# Patient Record
Sex: MALE | Race: WHITE | NOT HISPANIC OR LATINO | Employment: OTHER | URBAN - METROPOLITAN AREA
[De-identification: names, ages, dates, MRNs, and addresses within clinical notes are randomized per-mention and may not be internally consistent; named-entity substitution may affect disease eponyms.]

---

## 2018-02-13 ENCOUNTER — OFFICE VISIT (OUTPATIENT)
Dept: BARIATRICS | Facility: CLINIC | Age: 58
End: 2018-02-13

## 2018-02-13 VITALS
SYSTOLIC BLOOD PRESSURE: 150 MMHG | TEMPERATURE: 97.2 F | HEART RATE: 78 BPM | BODY MASS INDEX: 45.1 KG/M2 | HEIGHT: 70 IN | DIASTOLIC BLOOD PRESSURE: 80 MMHG | WEIGHT: 315 LBS

## 2018-02-13 DIAGNOSIS — E66.01 MORBID (SEVERE) OBESITY DUE TO EXCESS CALORIES (HCC): Primary | ICD-10-CM

## 2018-02-13 DIAGNOSIS — E66.09 OBESITY DUE TO EXCESS CALORIES, UNSPECIFIED CLASSIFICATION, UNSPECIFIED WHETHER SERIOUS COMORBIDITY PRESENT: Primary | ICD-10-CM

## 2018-02-13 PROCEDURE — RECHECK: Performed by: DIETITIAN, REGISTERED

## 2018-02-13 RX ORDER — LABETALOL 300 MG/1
300 TABLET, FILM COATED ORAL 2 TIMES DAILY
COMMUNITY
Start: 2017-04-27 | End: 2018-10-25 | Stop reason: ALTCHOICE

## 2018-02-13 RX ORDER — VALSARTAN 160 MG/1
1 TABLET ORAL
COMMUNITY
End: 2018-09-27 | Stop reason: ALTCHOICE

## 2018-02-13 RX ORDER — PANTOPRAZOLE SODIUM 40 MG/1
1 TABLET, DELAYED RELEASE ORAL DAILY
COMMUNITY
Start: 2017-04-27

## 2018-02-13 RX ORDER — FELODIPINE 10 MG/1
10 TABLET, EXTENDED RELEASE ORAL DAILY
COMMUNITY
End: 2018-10-17 | Stop reason: HOSPADM

## 2018-02-13 NOTE — PROGRESS NOTES
Bariatric Nutrition Assessment Note    Type of surgery    Interested in Vertical sleeve gastrectomy  Surgeon: Dr Ankit Herron  59 y o   male     Target weight: 172 7 lbs  Vitals:    02/13/18 0818   BP: 150/80   Pulse: 78   Temp: (!) 97 2 °F (36 2 °C)       Weight History   Onset of Obesity: Childhood  Family history of obesity: Yes  Wt Loss Attempts: Commercial Programs (IAC/[employer]th [employer], Tavares Ramachandran, etc )  FAD Diets (Cabbage soup, Grapefruit, Cleanse, etc )  Meal Replacements (Medifast, Slim Fast, etc )  Maximum Wt Lost: 45 lbs    Review of History and Medications   Past Medical History:   Diagnosis Date    Lumbar disc disease     Sleep apnea      Past Surgical History:   Procedure Laterality Date    BACK SURGERY      CHOLECYSTECTOMY       Social History     Social History    Marital status: /Civil Union     Spouse name: N/A    Number of children: N/A    Years of education: N/A     Social History Main Topics    Smoking status: Never Smoker    Smokeless tobacco: Never Used    Alcohol use Yes    Drug use: No    Sexual activity: Not Asked     Other Topics Concern    None     Social History Narrative    None       Current Outpatient Prescriptions:     felodipine (PLENDIL) 10 MG 24 hr tablet, Take by mouth, Disp: , Rfl:     labetalol (NORMODYNE) 300 mg tablet, Take 1 tablet by mouth, Disp: , Rfl:     pantoprazole (PROTONIX) 40 mg tablet, Take 1 tablet by mouth, Disp: , Rfl:     valsartan (DIOVAN) 160 mg tablet, Take 1 tablet by mouth, Disp: , Rfl:   Food Intake and Lifestyle Assessment   Food Intake Assessment completed via food log brought by patient  Breakfast: coffee, bagel  Snack: none  Lunch: kit reza candy bar, nuts  Snack: none  Dinner: [place] food  Snack: none  Beverage intake: water, diet soda, coffee/tea and alcohol  Estimated fluid intake per day: 40-60 oz water daily  Meals eaten away from home: 1-2 times a month  Typical meal pattern: 3 meals per day and 0 snacks per day  Eating Behaviors: Consumption of high calorie/ high fat foods, Large portion sizes and Mindless eating  Food allergies or intolerances: No Known Allergies  Cultural or Mosque considerations: Religion    Physical Assessment  Physical Activity  Types of exercise: None  Current physical limitations: back surgery 4 months ago    Psychosocial Assessment   Support systems: spouse  Socioeconomic factors: n/a    Nutrition Diagnosis  Diagnosis: Overweight / Obesity (NC-3 3) and Undesirable food choices (NB-1 7)  Related to: Physical inactivity and Excessive energy intake  As Evidenced by: BMI >25     Nutrition Prescription: Recommend the following diet  Low sugar and High protein    Interventions and Teaching   Discussed pre-op and post-op nutrition guidelines  Patient educated and handouts provided  Surgical changes to stomach / GI  Capacity of post-surgery stomach  Adequate hydration  Exercise  Suggestions for pre-op diet  Meal planning and preparation  Dietary and lifestyle changes  Techniques for self monitoring and keeping daily food journal  Vitamin / Mineral supplementation of Multivitamin with minerals and Vitamin D    Education provided to: patient and and wife who was also present at session    Barriers to learning: Consumption of high calorie/ high fat foods and Large portion sizes    Readiness to change: action    Prior research on procedure: internet and friends or family    Comprehension: verbalizes understanding     Expected Compliance: good  Recommendations  Pt is an appropriate candidate for surgery   Yes    Evaluation / Monitoring  Dietitian to Monitor: Eating pattern as discussed Body weight    Goals  Food journal, Complete lession plans 1-6 and Eat 3 meals per day, discuss physical activity with physician due to recent back surgery    Time Spent:   1 Hour

## 2018-02-13 NOTE — PROGRESS NOTES
Bariatric Behavioral Health Evaluation    Presenting Problem:  Jaycob Pain, : 1960    Patient here to reduce chronic pain, increase health and increase mobility    Is the patient seeking Bariatric Surgery Eval? Yes  If yes how long have you researched this surgery option  Patient has been doing research for the past 2 months  He does have a sister with bariatric surgery done 2 years ago  Realizes Post- Op Requirements? Yes     Pre-morbid level of function and history of present illness: Patient had back surgery (2017)    Psychiatric/Psychological Treatment Diagnosis:  Patient denies Axis I diagnosis or treatment    Outpatient Counselor No     Psychiatrist No     Have you had Inpatient Treatment?  No    Family Constellation (include relationship with each and Psych/Med HX)    Siblings  obesity    Domestic Violence No    Patient denies childhood trauma    Additional comments/stressors related to family/relationships/peer support:  Health, weight and chronic pain    Physical/Psychological Assessment:     Appearance: appropriate  Sociability: average  Affect: appropriate  Mood: calm  Thought Process: coherent  Speech: normal  Content: no impairment  Orientation: person  Yes , place  Yes , time  Yes , normal attention span  Yes , normal memory  Yes  , decreased in concentration ability  Yes  and normal judgement  Yes   Insight: emotional  good    Risk Assessment:     none    Recommendations: Recommended for surgery  yes    Risk of Harm to Self or Others:     Observation:     Interviews this interview only    Access to weapons yes     Weapons secured by: kept in protected area    Based on the previous information, the client presents the following risk of harm to self or others: low    BARIATRIC SURGERY EDUCATION CHECKLIST    I have received education related to my bariatric surgery process and understand:    Patients may be required to complete a psychiatric evaluation and receive clearance for surgery from their psychiatrist     Patients who undergo weight loss surgery are at higher risk of increased mental health concerns and suicide attempts  Patients may be required to complete a full substance abuse evaluation and then complete all treatment recommendations prior to surgery  If diagnosis of abuse/dependence results, patient may be required to remain sober for one (1) year before having bariatric surgery  Patients on psychiatric medications should check with their provider to discuss psychiatric medications and the changes in absorption  Patient should discuss all time release medications with provider and take all medications as prescribed  The recommendation is that there is no use of  any tobacco products, Hookah or  vapes for the bariatric post-operation patient  Bariatric surgery patients should not consume alcohol as a post-operative patient as it may increase risk of numerous health conditions including but not limited to alcohol abuse and ulcers  There is a possibility of weight regain if patient does not follow all program guidelines and recommendations  Bariatric surgery patients should exercise thirty (30) to sixty (60) minutes per day to maintain post-surgical weight loss  Research indicates that bariatric patients are more successful when they see a therapist for up to two (2) years post-op  Patients will follow all medical and dietary recommendations provided  Patient will keep all scheduled appointments and follow up with their physician for a minimum of five (5) years  Patient will take all vitamins as recommended  Post-operative vitamins are life-long  Patient reviewed Bariatric Surgery Education Checklist and agrees they have received education on these issues   Note:  Patient denies Axis I diagnosis or treatment  Patient educated regarding the impact of nicotine and alcohol on the post bariatric surgery patient    Patient meets criteria for surgery at this program and is referred to physician

## 2018-03-14 ENCOUNTER — OFFICE VISIT (OUTPATIENT)
Dept: BARIATRICS | Facility: CLINIC | Age: 58
End: 2018-03-14
Payer: COMMERCIAL

## 2018-03-14 VITALS
DIASTOLIC BLOOD PRESSURE: 70 MMHG | TEMPERATURE: 97.2 F | WEIGHT: 315 LBS | BODY MASS INDEX: 45.1 KG/M2 | HEIGHT: 70 IN | SYSTOLIC BLOOD PRESSURE: 130 MMHG | RESPIRATION RATE: 18 BRPM | HEART RATE: 89 BPM

## 2018-03-14 DIAGNOSIS — R63.5 ABNORMAL WEIGHT GAIN: ICD-10-CM

## 2018-03-14 DIAGNOSIS — K21.9 GASTROESOPHAGEAL REFLUX DISEASE, ESOPHAGITIS PRESENCE NOT SPECIFIED: ICD-10-CM

## 2018-03-14 DIAGNOSIS — Z01.818 PREOPERATIVE CLEARANCE: ICD-10-CM

## 2018-03-14 DIAGNOSIS — I10 BENIGN ESSENTIAL HTN: ICD-10-CM

## 2018-03-14 DIAGNOSIS — E66.01 MORBID OBESITY WITH BODY MASS INDEX (BMI) OF 40.0 TO 49.9 (HCC): Primary | ICD-10-CM

## 2018-03-14 DIAGNOSIS — R73.01 IFG (IMPAIRED FASTING GLUCOSE): ICD-10-CM

## 2018-03-14 DIAGNOSIS — G47.33 OSA (OBSTRUCTIVE SLEEP APNEA): ICD-10-CM

## 2018-03-14 PROCEDURE — 99204 OFFICE O/P NEW MOD 45 MIN: CPT | Performed by: PHYSICIAN ASSISTANT

## 2018-03-14 NOTE — PATIENT INSTRUCTIONS
Goals: Food log (ie ) www myfitnesspal com,sparkpeople  com,loseit com,calorieking  com,etc  baritastic  No sugary beverages  At least 64oz of water daily    Try to increase exercise by 10 minutes/day  Practice lesson plans 1-6 in bariatric manual   Practice 30/60 rule  Increase physical activity by 10 minutes daily  ~1800 calories/day    B: protein shake + fruit  L: 3-4 oz lean protein + 1 cup nonstarchy vegetables  D: 5 oz lean protein + 1 c nonstarchy vegetables  + <1/2 c starch  S: lean protein: string cheese, greek/soy yogurt, 1/4 c nuts + fruits/vegetables

## 2018-03-14 NOTE — ASSESSMENT & PLAN NOTE
Interested in Vertical Sleeve Gastrectomy with Dr Bipin Salinas  10% Weight loss-Not applicable   Screening labs-CBC, CMP, A1c, and LP within acceptable limits from 1/12/18   Check TSH, may tiburcio to repeat CBC and CMP prior to surgery  Support Group-Not Completed  Cardiac Risk Assessment-Not Completed  Upper Endoscopy-Not Completed  Sleep Medicine Assessment-using CPAP  Alcohol and nicotine use is not recommended following bariatric surgery  NSAIDs should be avoided following bariatric surgery

## 2018-04-11 ENCOUNTER — TELEPHONE (OUTPATIENT)
Dept: BARIATRICS | Facility: CLINIC | Age: 58
End: 2018-04-11

## 2018-04-25 ENCOUNTER — OFFICE VISIT (OUTPATIENT)
Dept: BARIATRICS | Facility: CLINIC | Age: 58
End: 2018-04-25
Payer: COMMERCIAL

## 2018-04-25 VITALS
BODY MASS INDEX: 45.1 KG/M2 | DIASTOLIC BLOOD PRESSURE: 82 MMHG | RESPIRATION RATE: 20 BRPM | HEIGHT: 70 IN | HEART RATE: 88 BPM | WEIGHT: 315 LBS | SYSTOLIC BLOOD PRESSURE: 134 MMHG | TEMPERATURE: 98 F

## 2018-04-25 DIAGNOSIS — E66.01 OBESITY, CLASS III, BMI 40-49.9 (MORBID OBESITY) (HCC): Primary | ICD-10-CM

## 2018-04-25 PROBLEM — E66.9 DIABETES MELLITUS TYPE 2 IN OBESE (HCC): Status: ACTIVE | Noted: 2018-04-25

## 2018-04-25 PROBLEM — E11.69 DIABETES MELLITUS TYPE 2 IN OBESE (HCC): Status: ACTIVE | Noted: 2018-04-25

## 2018-04-25 PROBLEM — K21.9 GASTROESOPHAGEAL REFLUX DISEASE: Status: ACTIVE | Noted: 2018-04-25

## 2018-04-25 PROBLEM — Z98.1 HISTORY OF LUMBAR FUSION: Status: ACTIVE | Noted: 2018-04-25

## 2018-04-25 PROBLEM — Z90.49 HX LAPAROSCOPIC CHOLECYSTECTOMY: Status: ACTIVE | Noted: 2018-04-25

## 2018-04-25 PROCEDURE — 99204 OFFICE O/P NEW MOD 45 MIN: CPT | Performed by: SURGERY

## 2018-04-25 RX ORDER — SILDENAFIL CITRATE 50 MG
TABLET ORAL
COMMUNITY
Start: 2018-04-11 | End: 2018-07-20

## 2018-04-25 RX ORDER — FLUTICASONE PROPIONATE 50 MCG
SPRAY, SUSPENSION (ML) NASAL
COMMUNITY
Start: 2018-04-11 | End: 2018-09-27 | Stop reason: ALTCHOICE

## 2018-04-25 NOTE — LETTER
April 25, 2018     Alex Mata MD  [address] [place] [address]    Patient: Lily Monson   YOB: 1960   Date of Visit: 4/25/2018       Dear Dr Sergio Esquivel:    Thank you for referring Ruben Mirza to me for evaluation  Below are the relevant portions of my assessment and plan of care      59 y o  yo male with a long standing h/o of obesity and inability to sustain any meaningful weight loss on his own despite several attempts      He is interested in the Laparoscopic sleeve gastrectomy but after discussions with me in office he is now also considering LRYGB which may be more beneficial for his weight loss and comorbidities  He will discuss this with his wife and return with his decision to the preop H&P      As a part of his pre op evaluation, he will be referred to a cardiologist   No need for sleep study as he has been diagnosed previously with SALAS on CPAP     He needs an EGD to evaluate the anatomy of his GI tract      I have spent over 45 minutes with him face to face in the office today discussing his options and details of the surgery  We have seen an animation of the surgery on the computer that illustrates how the operation is done and how the anatomy will be altered with the procedure   Over 50% of this was coordinating care      I have discussed and educated the patient with regards to the components of our multidisciplinary program and the importance of compliance and follow up in the post operative period      He was given the opportunity to ask questions and I have answered all of them      The patient was also instructed with regards to the importance of behavior modification, nutritional counseling, support meeting attendance and lifestyle changes that are important to ensure success      Although there is a great statistical chance of improvement or even resolution of most of his associated comorbidities, the results vary from patient to patient and they largely depend on his commitment and compliance  If you have questions, please do not hesitate to call me  I look forward to following Sandra Roque along with you           Sincerely,        Celsa Brown MD        CC: No Recipients

## 2018-04-25 NOTE — PROGRESS NOTES
BARIATRIC INITIAL CONSULT - BARIATRIC SURGERY    Atilio Martinez 59 y o  male MRN: [id number]  Unit/Bed#:  Encounter: [id number]      HPI:  Atilio Martinez is a 59 y o  male who presents with a longstanding history of morbid obesity and inability to sustain a meaningful weight loss  Here today to discuss bariatric options  Visit type: consultation     Symptoms: excess weight, weight increase, inability to loss weight, fatigue, knee pain and back pain    Associated Symptoms: sexual dysfunction    Associated Conditions: glucose intolerance, sleep apnea, abdominal obesity and hypergycemia  Disease Complications: diabetes, hypertension, sleep apnea and osteoarthritis  Weight Loss Interest: high  Previous Diet Trials: low calorie     Exercise Frequency:sedentary  Types of Exercise: walking    Review of Systems   All other systems reviewed and are negative  Historical Information   Past Medical History:   Diagnosis Date    Benign hypertension     Calculus of gallbladder without cholecystitis without obstruction     H/O nonmelanoma skin cancer     Lumbar disc disease     Mitral valve disorder     Obesity     Pre-operative laboratory examination     Sleep apnea     Sleep apnea      Past Surgical History:   Procedure Laterality Date    BACK SURGERY      CHOLECYSTECTOMY       Social History   History   Alcohol Use    Yes     Comment: occasionally     History   Drug Use No     History   Smoking Status    Never Smoker   Smokeless Tobacco    Never Used     Family History:   Family History   Problem Relation Age of Onset    Heart attack Father     Hypertension Father        Meds/Allergies   PTA meds:   Cannot display prior to admission medications because the patient has not been admitted in this contact       No Known Allergies    Objective     Current Vitals:   /82   Pulse 88   Temp 98 °F (36 7 °C)   Resp 20   Ht 5' 9 5" (1 765 m)   Wt (!) 152 kg (334 lb 8 oz)   BMI 48 69 kg/m² Invasive Devices          No matching active lines, drains, or airways          Physical Exam   Constitutional: He is oriented to person, place, and time  He appears well-developed  HENT:   Head: Normocephalic  Eyes: EOM are normal    Neck: Normal range of motion  Cardiovascular: Normal rate  Pulmonary/Chest: Effort normal    Abdominal: Soft  He exhibits no distension  There is no tenderness  There is no rebound and no guarding  Musculoskeletal: Normal range of motion  Neurological: He is alert and oriented to person, place, and time  Skin: Skin is warm and dry  Psychiatric: He has a normal mood and affect  His behavior is normal  Judgment and thought content normal        Lab Results: I have personally reviewed pertinent lab results  Assessment/PLAN:    59 y o  yo male with a long standing h/o of obesity and inability to sustain any meaningful weight loss on his own despite several attempts  He is interested in the Laparoscopic sleeve gastrectomy but after discussions with me in office he is now also considering LRYGB which may be more beneficial for his weight loss and comorbidities  He will discuss this with his wife and return with his decision to the preop H&P  As a part of his pre op evaluation, he will be referred to a cardiologist   No need for sleep study as he has been diagnosed previously with SALAS on CPAP    He needs an EGD to evaluate the anatomy of his GI tract  I have spent over 45 minutes with him face to face in the office today discussing his options and details of the surgery  We have seen an animation of the surgery on the computer that illustrates how the operation is done and how the anatomy will be altered with the procedure  Over 50% of this was coordinating care  I have discussed and educated the patient with regards to the components of our multidisciplinary program and the importance of compliance and follow up in the post operative period      He was given the opportunity to ask questions and I have answered all of them  The patient was also instructed with regards to the importance of behavior modification, nutritional counseling, support meeting attendance and lifestyle changes that are important to ensure success  Although there is a great statistical chance of improvement or even resolution of most of his associated comorbidities, the results vary from patient to patient and they largely depend on his commitment and compliance  He needs to lose 25 lbs prior to the operation        Hugo Li MD  4/25/2018  9:43 AM

## 2018-05-02 LAB — HBA1C MFR BLD HPLC: 5.5 %

## 2018-05-16 NOTE — ASSESSMENT & PLAN NOTE
Interested in Vertical Sleeve Gastrectomy with Dr Omari Bernal    10% Weight loss-Not applicable   Screening labs-labs within acceptable limits for bariatric surgery  Support Group-Not Completed  Cardiac Risk Assessment-Not Completed  Upper Endoscopy-scheduled for 5/18/18  Sleep Medicine Assessment-using CPAP  Alcohol and nicotine use is not recommended following bariatric surgery  NSAIDs should be avoided following bariatric surgery

## 2018-05-16 NOTE — PROGRESS NOTES
Assessment/Plan: Morbid obesity with body mass index (BMI) of 40 0 to 49 9 ([facility] )  Interested in Vertical Sleeve Gastrectomy with Dr Glenn Genao  10% Weight loss-Not applicable   Screening labs-labs within acceptable limits for bariatric surgery  Support Group-Not Completed  Cardiac Risk Assessment-Not Completed  Upper Endoscopy-scheduled for 5/18/18  Sleep Medicine Assessment-using CPAP  Alcohol and nicotine use is not recommended following bariatric surgery  NSAIDs should be avoided following bariatric surgery    Benign essential HTN  -improved on repeat  -taking valsartan, felodipine, and labetolol    SALAS (obstructive sleep apnea)  -encouraged continued use of CPAP machine    Diabetes mellitus type 2 in obese (HCC)  -A1c 6 5 % on recent labs  -should improve with weight loss, lifestyle changes, and following bariatric surgery  -f/u with PCP for further evaluation and management    GERD (gastroesophageal reflux disease)  -continue pantoprazole  -may benefit from RYGB > sleeve    Follow up in approximately 1 month with Surgical Dietician  Goals:  Food log (ie ) www myfitnesspal com,sparkpeople  com,loseit com,calorieking  com,etc  baritastic payal   No sugary beverages  At least 64oz of water daily    Increase physical activity by 10 minutes daily Gradually increase physical activity to a goal of 5 days per week for 30 minutes of MODERATE intensity PLUS 2 days per week of FULL BODY resistance training  Practice lesson plans 1-6 in bariatric manual   Practice 30/60 rule  1800 calories per day    B: premier protein + fruit or 2 eggs + fruit  L: 4 oz lean protein + 1 cup of nonstarchy vegetables or premier protein + 1 cup of nonstarchy vegetables  D: 5 oz lean protein + 1 cup of nonstarchy vegetables + <1/2 c of starch  S (1-2): egg, greek/soy protein, string cheese, 1/4 c of nuts, 1 tbsp PB + fruits/vegetables     Diagnoses and all orders for this visit:    Morbid obesity with body mass index (BMI) of 40 0 to 49 9 ([facility] )    Benign essential HTN    SALAS (obstructive sleep apnea)    Gastroesophageal reflux disease, esophagitis presence not specified    Diabetes mellitus type 2 in obese [facility])    Subjective:   Chief Complaint   Patient presents with    Weight Check     Patient here for 3/6 Bill-Weight Check  Patient ID: Ayaka Reed  is a 59 y o  male with excess weight/obesity here to pursue weight managment  Patient is pursuing Vertical Sleeve Gastrectomy  HPI  The patient presents for 3/6 weight check  The patient has been:  Following the lessons in the manual- yes  Practicing the 30/60 minute rule- yes  Food logging- no  Exercise- yes    B: skips or 2 eggs + 1 piece toast  L: leftovers  D: protein + vegetable + starch  S: ice cream    The following portions of the patient's history were reviewed and updated as appropriate: allergies, current medications, past family history, past medical history, past social history, past surgical history and problem list     Review of Systems   Respiratory: Negative for cough and shortness of breath  Cardiovascular: Negative for chest pain and palpitations  Gastrointestinal: Negative for abdominal pain, constipation, diarrhea, nausea and vomiting  Psychiatric/Behavioral:        Denies sx of depression     Objective:    /80 (BP Location: Left arm, Patient Position: Sitting, Cuff Size: Large)   Pulse 88   Temp 98 °F (36 7 °C) (Tympanic)   Resp 17   Ht 5' 9 5" (1 765 m)   Wt (!) 153 kg (336 lb 12 8 oz)   BMI 49 02 kg/m²      Physical Exam   Nursing note and vitals reviewed  Constitutional   General appearance: Abnormal   well developed and morbidly obese  Eyes No conjunctival pallor  Ears, Nose, Mouth, and Throat Oral mucosa moist    Pulmonary   Respiratory effort: No increased work of breathing or signs of respiratory distress  Auscultation of lungs: Clear to auscultation, equal breath sounds bilaterally, no wheezes, no rales, no rhonci  Cardiovascular   Auscultation of heart: Normal rate and rhythm, normal S1 and S2, without murmurs  Examination of extremities for edema and/or varicosities: trace edema  Abdomen   Abdomen: Abnormal   The abdomen was obese  Bowel sounds were normal  The abdomen was soft and nontender     Musculoskeletal   Gait and station: Normal     Psychiatric   Orientation to person, place and time: Normal     Affect: appropriate

## 2018-05-17 ENCOUNTER — ANESTHESIA EVENT (OUTPATIENT)
Dept: PERIOP | Facility: HOSPITAL | Age: 58
End: 2018-05-17
Payer: COMMERCIAL

## 2018-05-17 ENCOUNTER — OFFICE VISIT (OUTPATIENT)
Dept: BARIATRICS | Facility: CLINIC | Age: 58
End: 2018-05-17
Payer: COMMERCIAL

## 2018-05-17 VITALS
WEIGHT: 315 LBS | DIASTOLIC BLOOD PRESSURE: 80 MMHG | HEIGHT: 70 IN | HEART RATE: 88 BPM | TEMPERATURE: 98 F | BODY MASS INDEX: 45.1 KG/M2 | SYSTOLIC BLOOD PRESSURE: 134 MMHG | RESPIRATION RATE: 17 BRPM

## 2018-05-17 DIAGNOSIS — K21.9 GASTROESOPHAGEAL REFLUX DISEASE, ESOPHAGITIS PRESENCE NOT SPECIFIED: ICD-10-CM

## 2018-05-17 DIAGNOSIS — E66.01 MORBID OBESITY WITH BODY MASS INDEX (BMI) OF 40.0 TO 49.9 (HCC): Primary | ICD-10-CM

## 2018-05-17 DIAGNOSIS — G47.33 OSA (OBSTRUCTIVE SLEEP APNEA): ICD-10-CM

## 2018-05-17 DIAGNOSIS — I10 BENIGN ESSENTIAL HTN: ICD-10-CM

## 2018-05-17 DIAGNOSIS — E66.9 DIABETES MELLITUS TYPE 2 IN OBESE (HCC): ICD-10-CM

## 2018-05-17 DIAGNOSIS — E11.69 DIABETES MELLITUS TYPE 2 IN OBESE (HCC): ICD-10-CM

## 2018-05-17 PROCEDURE — 99214 OFFICE O/P EST MOD 30 MIN: CPT | Performed by: PHYSICIAN ASSISTANT

## 2018-05-17 NOTE — PATIENT INSTRUCTIONS
Goals: Food log (ie ) www myfitnesspal com,sparkpeople  com,loseit com,calorieking  com,etc  baritastic payal   No sugary beverages  At least 64oz of water daily    Increase physical activity by 10 minutes daily Gradually increase physical activity to a goal of 5 days per week for 30 minutes of MODERATE intensity PLUS 2 days per week of FULL BODY resistance training  Practice lesson plans 1-6 in bariatric manual   Practice 30/60 rule  1800 calories per day  Please attend support group    B: premier protein + fruit or 2 eggs + fruit  L: 4 oz lean protein + 1 cup of nonstarchy vegetables or premier protein + 1 cup of nonstarchy vegetables  D: 5 oz lean protein + 1 cup of nonstarchy vegetables + <1/2 c of starch  S (1-2): egg, greek/soy protein, string cheese, 1/4 c of nuts, 1 tbsp PB + fruits/vegetables

## 2018-05-17 NOTE — ASSESSMENT & PLAN NOTE
-A1c 6 5 % on recent labs  -should improve with weight loss, lifestyle changes, and following bariatric surgery  -f/u with PCP for further evaluation and management

## 2018-05-18 ENCOUNTER — ANESTHESIA (OUTPATIENT)
Dept: PERIOP | Facility: HOSPITAL | Age: 58
End: 2018-05-18
Payer: COMMERCIAL

## 2018-05-18 ENCOUNTER — HOSPITAL ENCOUNTER (OUTPATIENT)
Facility: HOSPITAL | Age: 58
Setting detail: OUTPATIENT SURGERY
Discharge: HOME | End: 2018-05-18
Attending: SURGERY | Admitting: SURGERY
Payer: COMMERCIAL

## 2018-05-18 VITALS
HEART RATE: 76 BPM | DIASTOLIC BLOOD PRESSURE: 63 MMHG | SYSTOLIC BLOOD PRESSURE: 124 MMHG | RESPIRATION RATE: 15 BRPM | BODY MASS INDEX: 44.1 KG/M2 | TEMPERATURE: 98.2 F | WEIGHT: 315 LBS | OXYGEN SATURATION: 95 % | HEIGHT: 71 IN

## 2018-05-18 DIAGNOSIS — E66.01 MORBID OBESITY (HCC): ICD-10-CM

## 2018-05-18 DIAGNOSIS — K21.9 GASTROESOPHAGEAL REFLUX DISEASE, ESOPHAGITIS PRESENCE NOT SPECIFIED: ICD-10-CM

## 2018-05-18 PROCEDURE — 43239 EGD BIOPSY SINGLE/MULTIPLE: CPT | Performed by: SURGERY

## 2018-05-18 PROCEDURE — 88305 TISSUE EXAM BY PATHOLOGIST: CPT | Performed by: PATHOLOGY

## 2018-05-18 PROCEDURE — 88342 IMHCHEM/IMCYTCHM 1ST ANTB: CPT | Performed by: PATHOLOGY

## 2018-05-18 RX ORDER — SODIUM CHLORIDE, SODIUM LACTATE, POTASSIUM CHLORIDE, CALCIUM CHLORIDE 600; 310; 30; 20 MG/100ML; MG/100ML; MG/100ML; MG/100ML
100 INJECTION, SOLUTION INTRAVENOUS CONTINUOUS
Status: DISCONTINUED | OUTPATIENT
Start: 2018-05-18 | End: 2018-05-18 | Stop reason: HOSPADM

## 2018-05-18 RX ORDER — PROPOFOL 10 MG/ML
INJECTION, EMULSION INTRAVENOUS AS NEEDED
Status: DISCONTINUED | OUTPATIENT
Start: 2018-05-18 | End: 2018-05-18 | Stop reason: SURG

## 2018-05-18 RX ADMIN — SODIUM CHLORIDE, SODIUM LACTATE, POTASSIUM CHLORIDE, AND CALCIUM CHLORIDE 100 ML/HR: .6; .31; .03; .02 INJECTION, SOLUTION INTRAVENOUS at 06:45

## 2018-05-18 RX ADMIN — PROPOFOL 100 MG: 10 INJECTION, EMULSION INTRAVENOUS at 07:36

## 2018-05-18 RX ADMIN — PROPOFOL 50 MG: 10 INJECTION, EMULSION INTRAVENOUS at 07:39

## 2018-05-18 NOTE — ANESTHESIA POSTPROCEDURE EVALUATION
Post-Op Assessment Note      CV Status:  Stable    Mental Status:  Awake    Hydration Status:  Stable    PONV Controlled:  None    Airway Patency:  Patent    Post Op Vitals Reviewed: Yes          Staff: Anesthesiologist, CRNA           /86 (05/18/18 0744)    Temp      Pulse 88 (05/18/18 0744)   Resp 14 (05/18/18 0744)    SpO2 98 % (05/18/18 0744)

## 2018-05-18 NOTE — H&P
This is a 59 y o  male with a history of morbid obesity and Body mass index is 46 78 kg/m²  Here for an EGD to evaluate the anatomy of the GI tract  Physical Exam    AAOx3  RRR  CTA B  Abdomen obese  Benign  A/P:    This is a 59 y o  male with a history of morbid obesity and Body mass index is 46 78 kg/m²       Will proceed with the EGD and biopsies        Romero Hazel MD  05/18/18  7:24 AM

## 2018-05-18 NOTE — ANESTHESIA PREPROCEDURE EVALUATION
Review of Systems/Medical History  Patient summary reviewed  Chart reviewed  No history of anesthetic complications     Cardiovascular  Hypertension controlled,    Pulmonary  Sleep apnea CPAP,        GI/Hepatic    GERD well controlled,             Endo/Other  Diabetes well controlled type 2 Diet controlled,   Obesity  super morbid obesity   GYN       Hematology  Negative hematology ROS      Musculoskeletal  Negative musculoskeletal ROS        Neurology  Negative neurology ROS      Psychology   Negative psychology ROS              Physical Exam    Airway    Mallampati score: III  TM Distance: >3 FB  Neck ROM: full     Dental   Comment: No loose,     Cardiovascular  Rhythm: regular, Rate: normal,     Pulmonary  Breath sounds clear to auscultation,     Other Findings        Anesthesia Plan  ASA Score- 3     Anesthesia Type- IV sedation with anesthesia with ASA Monitors  Additional Monitors:   Airway Plan:         Plan Factors-    Induction- intravenous  Postoperative Plan-     Informed Consent- Anesthetic plan and risks discussed with patient  I personally reviewed this patient with the CRNA  Discussed and agreed on the Anesthesia Plan with the CRNA  Lilliana Astudillo

## 2018-05-18 NOTE — DISCHARGE INSTRUCTIONS
Upper Endoscopy   WHAT YOU NEED TO KNOW:   An upper endoscopy is also called an upper gastrointestinal (GI) endoscopy, or an esophagogastroduodenoscopy (EGD)  You may feel bloated, gassy, or have some abdominal discomfort after your procedure  Your throat may be sore for 24 to 36 hours  You may burp or pass gas from air that is still inside your body  DISCHARGE INSTRUCTIONS:   Call 911 for any of the following:   · You have sudden chest pain or trouble breathing  Seek care immediately if:   · You feel dizzy or faint  · You have trouble swallowing  · Your bowel movements are very dark or black  · Your abdomen is hard and firm and you have severe pain  · You vomit blood  Contact your healthcare provider if:   · You feel full or bloated and cannot burp or pass gas  · You have not had a bowel movement for 3 days after your procedure  · You have neck pain  · You have a fever or chills  · You have nausea or are vomiting  · You have a rash or hives  · You have questions or concerns about your endoscopy  Relieve a sore throat:  Suck on throat lozenges or crushed ice  Gargle with a small amount of warm salt water  Mix 1 teaspoon of salt and 1 cup of warm water to make salt water  Relieve gas and discomfort from bloating:  Lie on your right side with a heating pad on your abdomen  Take short walks to help pass gas  Eat small meals until bloating is relieved  Rest after your procedure: You have been given medicine to relax you  Do not  drive or make important decisions until the day after your procedure  Return to your normal activity as directed  You can usually return to work the day after your procedure  Follow up with your healthcare provider as directed:  Write down your questions so you remember to ask them during your visits     © 2017 [address] is for End User's use only and may not be sold, redistributed or otherwise used for commercial purposes  All illustrations and images included in CareNotes® are the copyrighted property of A D A M , Inc  or Randolph Kam  The above information is an  only  It is not intended as medical advice for individual conditions or treatments  Talk to your doctor, nurse or pharmacist before following any medical regimen to see if it is safe and effective for you

## 2018-05-18 NOTE — OP NOTE
OPERATIVE REPORT  PATIENT NAME: Monica Wade    :  1960  MRN: [id number]  Pt Location: [facility] 02    SURGERY DATE: 2018    Surgeon(s) and Role:     * Sunny Moreira MD - Primary    Preop Diagnosis:  Morbid obesity ([facility] ) [E66 01]  Gastroesophageal reflux disease, esophagitis presence not specified [K21 9]    Post-Op Diagnosis Codes:     * Morbid obesity ([facility] ) [E66 01]     * Gastroesophageal reflux disease, esophagitis presence not specified [K21 9]    Procedure(s) (LRB):  ESOPHAGOGASTRODUODENOSCOPY (EGD) (N/A)    Specimen(s):  ID Type Source Tests Collected by Time Destination   1 : antrum Tissue Stomach TISSUE EXAM Sunny Moreira MD 2018 [id number]        Estimated Blood Loss:   Minimal    Drains: none       Anesthesia Type:   IV Sedation with Anesthesia    Operative Indications: Morbid obesity ([facility] ) [E66 01]  Gastroesophageal reflux disease, esophagitis presence not specified [K21 9]      Operative Findings:  Gastritis, small hiatal hernia    Complications:   None    Procedure and Technique:    Indication:   Patient suffers from morbid obesity and associated co-morbidities  and failed to achieve any meaningful or sustainable weight loss  Patient presented for a bariatric procedure and was consented for a preoperative upper endoscopy and biopsy to rule out H  Pylori  Description of the procedure: The patient was brought to the endoscopy suite and was placed in  left lateral decubitus position  A time-out was called and the patient was identified by name and by armband  The patient received IV  Propofol under closed monitoring  by the anesthesiologist and nurse anesthesist     Upper endoscopy was performed under direct visualization     Esophagus was visualized and showed normal mucosa   The GE junction at 38cm from incisors   The stomach was then inspected and showed mild gastritis at the antrum    First and second portion of duodenum were inspected and appeared to be normal in appearance  Biopsy was taken with a punch biopsy forceps from the antrum and sent to pathology to rule out H  Pylori  Retroflex view of the GE junction was obtained and a small hiatal hernia was noted      The patient tolerated the procedure well and was transferred to the recovery unit in stable condition        I was present for the entire procedure and A qualified resident physician was not available    Patient Disposition:  PACU  and hemodynamically stable    SIGNATURE: Daisha Sena MD  DATE: May 18, 2018  TIME: 7:44 AM

## 2018-06-20 ENCOUNTER — OFFICE VISIT (OUTPATIENT)
Dept: BARIATRICS | Facility: CLINIC | Age: 58
End: 2018-06-20

## 2018-06-20 VITALS — WEIGHT: 315 LBS | HEIGHT: 71 IN | BODY MASS INDEX: 44.1 KG/M2

## 2018-06-20 DIAGNOSIS — E66.01 MORBID OBESITY WITH BODY MASS INDEX (BMI) OF 40.0 TO 49.9 (HCC): Primary | ICD-10-CM

## 2018-06-20 PROCEDURE — RECHECK: Performed by: DIETITIAN, REGISTERED

## 2018-06-20 NOTE — PROGRESS NOTES
Bariatric Follow Up Nutrition Note    Preop  6 Month Program  Patient started at [facility] but due to insurance needs to transfer to [facility] for surgery     Type of surgery  Preop  Surgery Date: patient has 6 month program, will be completed August   Surgeon: Dr Cate Dawkins  59 y o   male  Height 5' 11" (1 803 m), weight (!) 151 kg (332 lb 4 8 oz)  Body mass index is 46 35 kg/m²  Patient lost 4 pounds since last appointment 5/17/2018    [place] [address] [facility]  Equation:    BMR 2342 kcal per day  Estimated calories for weight loss : 1810 to 2310 kcal per day ( 1 to 2 pounds per week weight loss )  Estimated protein needs : 80-95 grams per day ( 1 0-1 2 grams/kg IBW)    Weight in (lb) to have BMI = 25: 172 7 lb  Pre-Op Excess Wt: 159 6 lb      Review of History and Medications   Past Medical History:   Diagnosis Date    Benign hypertension     Calculus of gallbladder without cholecystitis without obstruction     H/O nonmelanoma skin cancer     Lumbar disc disease     Mitral valve disorder     Obesity     Pre-operative laboratory examination     Sleep apnea     cpap    Sleep apnea      Past Surgical History:   Procedure Laterality Date    BACK SURGERY      CHOLECYSTECTOMY      PA EGD TRANSORAL BIOPSY SINGLE/MULTIPLE N/A 5/18/2018    Procedure: ESOPHAGOGASTRODUODENOSCOPY (EGD); Surgeon: Tawana Roman MD;  Location: [facility];   Service: General     Social History     Social History    Marital status: /Civil Union     Spouse name: N/A    Number of children: N/A    Years of education: N/A     Social History Main Topics    Smoking status: Never Smoker    Smokeless tobacco: Never Used    Alcohol use Yes      Comment: occasionally    Drug use: No    Sexual activity: Not on file     Other Topics Concern    Not on file     Social History Narrative    No narrative on file       Current Outpatient Prescriptions:     felodipine (PLENDIL) 10 MG 24 hr tablet, Take by mouth, Disp: , Rfl:     fluticasone (FLONASE) 50 mcg/act nasal spray, , Disp: , Rfl:     labetalol (NORMODYNE) 300 mg tablet, Take 1 tablet by mouth, Disp: , Rfl:     pantoprazole (PROTONIX) 40 mg tablet, Take 1 tablet by mouth, Disp: , Rfl:     valsartan (DIOVAN) 160 mg tablet, Take 1 tablet by mouth, Disp: , Rfl:     VIAGRA 50 MG tablet, , Disp: , Rfl:     Food Intake and Lifestyle Assessment   Food Intake Assessment completed via 24 hour recall  Breakfast: Premier protein shake and fruit   Snack: 0   Lunch: Premier protein shake and vegetables   Snack: 0  Dinner: lean protein, vegetables, 1/2 cup starch   Snack: 0  Beverage intake: water and protein shakes   Diet texture/stage: regular  Protein supplement: Premier  Estimated protein intake per day: 80-90 grams per day   Estimated fluid intake per day: 64 ounces or more   Meals eaten away from home: 0  Typical meal pattern: 3 meals per day and 0-1 snacks per day  Eating Behaviors: Does not drink with meals and waits 30-minutes after meal before resuming drinking and has decreased carbonation to 1 per week, decreased coffee to 16 ounces per day, drinks beer occasionally and has decreased his portions by eating off a smaller plate    Patient is taking an adult multivitamin and mineral  Food allergies or intolerances: none noted   Cultural or Sikh considerations: n/a    Physical Assessment  Nutrition Related Findings  obese    Physical Activity  Types of exercise: Walking  yardwork ( push mower) and golfing   Current physical limitations: none noted     Psychosocial Assessment   Support systems: spouse  Socioeconomic factors: n/a    Nutrition Diagnosis  Diagnosis: Overweight / Obesity (NC-3 3)  Related to: Physical inactivity and Excessive energy intake  As Evidenced by: BMI >25     Interventions and Teaching   Patient educated on post-op nutrition guidelines  Patient educated and handouts provided    Adequate hydration  Exercise  Suggestions for pre-op diet  Nutrition considerations after surgery  Protein supplements  Appropriate carbohydrate, protein, and fat intake, and food/fluid choices to maximize safe weight loss, nutrient intake, and tolerance   Dietary and lifestyle changes  Techniques for self monitoring and keeping daily food journal  Vitamin / Mineral supplementation of Multivitamin with minerals, Calcium, Vitamin B12 and Vitamin D    Education provided to: patient    Barriers to learning: No barriers identified    Readiness to change: action    Comprehension: demonstrated understanding     Expected Compliance: good    Reviewed work flow- patient will either fax blood work ( done at [address] [place] [address]) or bring to next appointment  Goals  1   continue to current dietary regimen, complete lesson plans 1 to 6 in bariatric book   2    Attend support group at [facility]    Evaluation / Monitoring  Eating pattern as discussed Tolerance of nutrition prescription Body weight Lab values Physical activity      Time Spent:   30 Minutes

## 2018-07-16 RX ORDER — HYDROCHLOROTHIAZIDE 12.5 MG/1
CAPSULE, GELATIN COATED ORAL
COMMUNITY
End: 2018-09-27 | Stop reason: ALTCHOICE

## 2018-07-20 ENCOUNTER — OFFICE VISIT (OUTPATIENT)
Dept: BARIATRICS | Facility: CLINIC | Age: 58
End: 2018-07-20
Payer: COMMERCIAL

## 2018-07-20 VITALS
DIASTOLIC BLOOD PRESSURE: 82 MMHG | WEIGHT: 315 LBS | HEART RATE: 70 BPM | SYSTOLIC BLOOD PRESSURE: 140 MMHG | TEMPERATURE: 96.6 F | HEIGHT: 71 IN | BODY MASS INDEX: 44.1 KG/M2

## 2018-07-20 DIAGNOSIS — E11.69 DIABETES MELLITUS TYPE 2 IN OBESE (HCC): ICD-10-CM

## 2018-07-20 DIAGNOSIS — I10 BENIGN ESSENTIAL HTN: ICD-10-CM

## 2018-07-20 DIAGNOSIS — E66.01 MORBID OBESITY WITH BODY MASS INDEX (BMI) OF 40.0 TO 49.9 (HCC): Primary | ICD-10-CM

## 2018-07-20 DIAGNOSIS — G47.33 OSA (OBSTRUCTIVE SLEEP APNEA): ICD-10-CM

## 2018-07-20 DIAGNOSIS — K21.9 GASTROESOPHAGEAL REFLUX DISEASE, ESOPHAGITIS PRESENCE NOT SPECIFIED: ICD-10-CM

## 2018-07-20 DIAGNOSIS — E66.9 DIABETES MELLITUS TYPE 2 IN OBESE (HCC): ICD-10-CM

## 2018-07-20 PROBLEM — R73.9 HYPERGLYCEMIA: Status: ACTIVE | Noted: 2018-01-11

## 2018-07-20 PROBLEM — M54.50 LOW BACK PAIN: Status: ACTIVE | Noted: 2018-07-20

## 2018-07-20 PROBLEM — F32.A DEPRESSION: Status: ACTIVE | Noted: 2017-11-13

## 2018-07-20 PROCEDURE — 99214 OFFICE O/P EST MOD 30 MIN: CPT | Performed by: SURGERY

## 2018-07-20 RX ORDER — SILDENAFIL 50 MG/1
TABLET, FILM COATED ORAL AS NEEDED
COMMUNITY
Start: 2018-07-10

## 2018-07-20 NOTE — PROGRESS NOTES
BARIATRIC INITIAL CONSULT - BARIATRIC SURGERY    Eloisa Mohamud 59 y o  male MRN: [id number]  Unit/Bed#:  Encounter: [id number]      HPI:  Eloisa Mohamud is a 59 y o  male who presents with a longstanding history of morbid obesity and inability to sustain a meaningful weight loss  Here today to discuss bariatric options  Visit type: initial visit    Symptoms: inability to loss weight    Associated Symptoms: depressed mood and sexual dysfunction    Associated Conditions: sleep apnea, abdominal obesity and Diabetes  Disease Complications: diabetes, hypertension and sleep apnea  Weight Loss Interest: high  Previous Diet Trials: low carb    Exercise Frequency:infrequency  Types of Exercise: walking    Review of Systems   All other systems reviewed and are negative  Historical Information   Past Medical History:   Diagnosis Date    Benign hypertension     Calculus of gallbladder without cholecystitis without obstruction     H/O nonmelanoma skin cancer     Lumbar disc disease     Mitral valve disorder     Obesity     Pre-operative laboratory examination     Sleep apnea     cpap    Sleep apnea      Past Surgical History:   Procedure Laterality Date    BACK SURGERY      CHOLECYSTECTOMY      CT EGD TRANSORAL BIOPSY SINGLE/MULTIPLE N/A 5/18/2018    Procedure: ESOPHAGOGASTRODUODENOSCOPY (EGD); Surgeon: Celsa Brown MD;  Location: [facility];   Service: General     Social History   History   Alcohol Use    Yes     Comment: occasional     History   Drug Use No     History   Smoking Status    Never Smoker   Smokeless Tobacco    Never Used     Family History: non-contributory    Meds/Allergies   all medications and allergies reviewed  No Known Allergies    Objective     Current Vitals:   Blood Pressure: 140/82 (07/20/18 0910)  Pulse: 70 (07/20/18 0910)  Temperature: (!) 96 6 °F (35 9 °C) (07/20/18 0910)  Height: 5' 11" (180 3 cm) (07/20/18 0910)  Weight - Scale: (!) 151 kg (333 lb) (07/20/18 5027)    [unfilled]    Invasive Devices          No matching active lines, drains, or airways          Physical Exam   Constitutional: He is oriented to person, place, and time  He appears well-developed and well-nourished  No distress  Neurological: He is alert and oriented to person, place, and time  Psychiatric: He has a normal mood and affect  His behavior is normal  Judgment and thought content normal        Lab Results: I have personally reviewed pertinent lab results  Imaging: I have personally reviewed pertinent reports  EKG, Pathology, and Other Studies: I have personally reviewed pertinent reports  He had an endoscopy that revealed gastritis and small hiatal hernia  The biopsies were positive for   A  Stomach, antrum biopsy:    -Benign gastric- oxyntoantral type mucosa with minimal superficial chronic inactive gastritis   -No evidence of Paneth cell metaplasia or atrophic gastritis   -No evidence of dysplasia or malignancy   -No H Pylori organisms identified on immunohistochemical stained slide  Control reacted appropriately    Assessment/PLAN:    59 y o  yo male with a long standing h/o of obesity and inability to sustain any meaningful weight loss on his own despite several attempts  He started the process at our campus at [facility] but he is a high risk patient and he was referred to our center at [facility] instead  He is interested in the Laparoscopic Gastric Bypass or sleeve gastrectomy  As a part of his pre op evaluation, he will be referred to a cardiologist  He has SALAS and wears a CPAP machine    He had an EGD done and revealed gastritis as well as a hiatal hernia  I have spent over 45 minutes with him face to face in the office today discussing his options and details of the surgery  We have seen an animation of the surgery on the computer that illustrates how the operation is done and how the anatomy will be altered with the procedure   Over 50% of this was coordinating care  I have discussed and educated the patient with regards to the components of our multidisciplinary program and the importance of compliance and follow up in the post operative period  He was given the opportunity to ask questions and I have answered all of them  The patient was also instructed with regards to the importance of behavior modification, nutritional counseling, support meeting attendance and lifestyle changes that are important to ensure success  Although there is a great statistical chance of improvement or even resolution of most of his associated comorbidities, the results vary from patient to patient and they largely depend on his commitment and compliance  He needs to lose 18 lbs prior to the operation        Suresh Kerns MD  7/20/2018  9:22 AM

## 2018-07-20 NOTE — LETTER
July 20, 2018     Darrell Calzada MD  [address], [address] [address]    Patient: Atilio Martinez   YOB: 1960   Date of Visit: 7/20/2018       Dear Dr Roxanna Real:    Thank you for referring Lorra Goodpasture to me for evaluation  Below are my notes for this consultation  If you have questions, please do not hesitate to call me  I look forward to following your patient along with you  Sincerely,        Michelle Cueto MD        CC: No Recipients  Michelle Cueto MD  7/20/2018  9:47 AM  Sign at close encounter      [address] [facility] 59 y o  male MRN: [id number]  Unit/Bed#:  Encounter: [id number]      HPI:  Atilio Martinez is a 59 y o  male who presents with a longstanding history of morbid obesity and inability to sustain a meaningful weight loss  Here today to discuss bariatric options  Visit type: initial visit    Symptoms: inability to loss weight    Associated Symptoms: depressed mood and sexual dysfunction    Associated Conditions: sleep apnea, abdominal obesity and Diabetes  Disease Complications: diabetes, hypertension and sleep apnea  Weight Loss Interest: high  Previous Diet Trials: low carb    Exercise Frequency:infrequency  Types of Exercise: walking    Review of Systems   All other systems reviewed and are negative  Historical Information   Past Medical History:   Diagnosis Date    Benign hypertension     Calculus of gallbladder without cholecystitis without obstruction     H/O nonmelanoma skin cancer     Lumbar disc disease     Mitral valve disorder     Obesity     Pre-operative laboratory examination     Sleep apnea     cpap    Sleep apnea      Past Surgical History:   Procedure Laterality Date    BACK SURGERY      CHOLECYSTECTOMY      MT EGD TRANSORAL BIOPSY SINGLE/MULTIPLE N/A 5/18/2018    Procedure: ESOPHAGOGASTRODUODENOSCOPY (EGD);   Surgeon: Brandi Kemp MD; Location: [facility]; Service: General     Social History   History   Alcohol Use    Yes     Comment: occasional     History   Drug Use No     History   Smoking Status    Never Smoker   Smokeless Tobacco    Never Used     Family History: non-contributory    Meds/Allergies   all medications and allergies reviewed  No Known Allergies    Objective     Current Vitals:   Blood Pressure: 140/82 (07/20/18 0910)  Pulse: 70 (07/20/18 0910)  Temperature: (!) 96 6 °F (35 9 °C) (07/20/18 0910)  Height: 5' 11" (180 3 cm) (07/20/18 0910)  Weight - Scale: (!) 151 kg (333 lb) (07/20/18 0910)    [unfilled]    Invasive Devices          No matching active lines, drains, or airways          Physical Exam   Constitutional: He is oriented to person, place, and time  He appears well-developed and well-nourished  No distress  Neurological: He is alert and oriented to person, place, and time  Psychiatric: He has a normal mood and affect  His behavior is normal  Judgment and thought content normal        Lab Results: I have personally reviewed pertinent lab results  Imaging: I have personally reviewed pertinent reports  EKG, Pathology, and Other Studies: I have personally reviewed pertinent reports  He had an endoscopy that revealed gastritis and small hiatal hernia  The biopsies were positive for   A  Stomach, antrum biopsy:    -Benign gastric- oxyntoantral type mucosa with minimal superficial chronic inactive gastritis   -No evidence of Paneth cell metaplasia or atrophic gastritis   -No evidence of dysplasia or malignancy   -No H Pylori organisms identified on immunohistochemical stained slide  Control reacted appropriately    Assessment/PLAN:    59 y o  yo male with a long standing h/o of obesity and inability to sustain any meaningful weight loss on his own despite several attempts    He started the process at our campus at [facility] but he is a high risk patient and he was referred to our center at [facility] instead  He is interested in the Laparoscopic Gastric Bypass or sleeve gastrectomy  As a part of his pre op evaluation, he will be referred to a cardiologist  He has SALAS and wears a CPAP machine    He had an EGD done and revealed gastritis as well as a hiatal hernia  I have spent over 45 minutes with him face to face in the office today discussing his options and details of the surgery  We have seen an animation of the surgery on the computer that illustrates how the operation is done and how the anatomy will be altered with the procedure  Over 50% of this was coordinating care  I have discussed and educated the patient with regards to the components of our multidisciplinary program and the importance of compliance and follow up in the post operative period  He was given the opportunity to ask questions and I have answered all of them  The patient was also instructed with regards to the importance of behavior modification, nutritional counseling, support meeting attendance and lifestyle changes that are important to ensure success  Although there is a great statistical chance of improvement or even resolution of most of his associated comorbidities, the results vary from patient to patient and they largely depend on his commitment and compliance  He needs to lose 18 lbs prior to the operation        Sampson Faustin MD  7/20/2018  9:22 AM

## 2018-08-27 ENCOUNTER — OFFICE VISIT (OUTPATIENT)
Dept: BARIATRICS | Facility: CLINIC | Age: 58
End: 2018-08-27
Payer: COMMERCIAL

## 2018-08-27 VITALS
BODY MASS INDEX: 44.1 KG/M2 | HEIGHT: 71 IN | DIASTOLIC BLOOD PRESSURE: 80 MMHG | RESPIRATION RATE: 17 BRPM | TEMPERATURE: 96.7 F | SYSTOLIC BLOOD PRESSURE: 130 MMHG | HEART RATE: 82 BPM | WEIGHT: 315 LBS

## 2018-08-27 DIAGNOSIS — E66.9 DIABETES MELLITUS TYPE 2 IN OBESE (HCC): ICD-10-CM

## 2018-08-27 DIAGNOSIS — E66.01 MORBID OBESITY WITH BODY MASS INDEX (BMI) OF 40.0 TO 49.9 (HCC): Primary | ICD-10-CM

## 2018-08-27 DIAGNOSIS — E11.69 DIABETES MELLITUS TYPE 2 IN OBESE (HCC): ICD-10-CM

## 2018-08-27 DIAGNOSIS — G47.33 OSA (OBSTRUCTIVE SLEEP APNEA): ICD-10-CM

## 2018-08-27 DIAGNOSIS — I10 ESSENTIAL (PRIMARY) HYPERTENSION: ICD-10-CM

## 2018-08-27 DIAGNOSIS — K21.9 GASTROESOPHAGEAL REFLUX DISEASE WITHOUT ESOPHAGITIS: ICD-10-CM

## 2018-08-27 PROCEDURE — 99214 OFFICE O/P EST MOD 30 MIN: CPT | Performed by: PHYSICIAN ASSISTANT

## 2018-08-27 RX ORDER — LOSARTAN POTASSIUM 50 MG/1
50 TABLET ORAL DAILY
Refills: 3 | COMMUNITY
Start: 2018-08-08 | End: 2018-10-25 | Stop reason: ALTCHOICE

## 2018-08-27 NOTE — PROGRESS NOTES
Assessment/Plan: Morbid obesity with body mass index (BMI) of 40 0 to 49 9 ([place] [address] )  Interested in Vertical Sleeve Gastrectomy vs RYGB (as of 6/6 weight check pt is most interested in RYGB) with Dr Tye Kwong  10% Weight loss-Not applicable   Screening labs-labs within acceptable limits for bariatric surgery  Support Group-Not Completed  Cardiac Risk Assessment-Completed: gastritis, small hiatal hernia - see letter 6/22/18  Upper Endoscopy-completed; H  Pylori- negative  Sleep Medicine Assessment-using CPAP  Alcohol and nicotine use is not recommended following bariatric surgery  NSAIDs should be avoided following bariatric surgery    Essential (primary) hypertension  -improved on repeat  -continue with current regimen    Gastroesophageal reflux disease  +gastritis, small hiatal hernia  -continue PPI  -sx may improve following bariatric surgery  -discuss hiatal hernia w/ surgeon at final H and P    SALAS (obstructive sleep apnea)  -encouraged continued use of CPAP machine    Diabetes mellitus type 2 in obese (HCC)  -last A1c 6 5 %  -may improve/resolve following bariatric surgery  -continue management with PCP    Follow up for final H and P     Goals:  Food log (ie ) www myfitnesspal com,sparkpeople  com,loseit com,calorieking  com,etc  baritastic  No sugary beverages  At least 64oz of water daily  Increase physical activity by 10 minutes daily   Gradually increase physical activity to a goal of 5 days per week for 30 minutes of MODERATE intensity PLUS 2 days per week of FULL BODY resistance training  Practice lesson plans 1-6 in bariatric manual  and Practice 30/60 rule  Please attend support group     Diagnoses and all orders for this visit:    Morbid obesity with body mass index (BMI) of 40 0 to 49 9 ([facility])    Essential (primary) hypertension    Gastroesophageal reflux disease without esophagitis    SALAS (obstructive sleep apnea)    Diabetes mellitus type 2 in obese ([facility] )    Other orders  -     losartan (COZAAR) 50 mg tablet; Take 50 mg by mouth daily    Subjective:   Chief Complaint   Patient presents with    Weight Check     Patient here for 6/6 [facility]-Weight Check  Patient ID: Fatuma Daniels  is a 59 y o  male with excess weight/obesity here to pursue weight management  Patient is pursuing Bariatric surgery  HPI  The patient presents for 6/6 weight check  The patient has been:  Following the lessons in the manual- yes  Practicing the 30/60 minute rule- yes  Food logging- no, being more aware, watching portions  Exercise- no    The following portions of the patient's history were reviewed and updated as appropriate: allergies, current medications, past family history, past medical history, past social history, past surgical history and problem list     Review of Systems   Respiratory: Negative for cough and shortness of breath  Cardiovascular: Negative for chest pain and palpitations  Gastrointestinal: Negative for abdominal pain, constipation and diarrhea  Psychiatric/Behavioral:        Denies sx of depression     Objective:    /80 (BP Location: Left arm, Patient Position: Sitting, Cuff Size: Large)   Pulse 82   Temp (!) 96 7 °F (35 9 °C) (Tympanic)   Resp 17   Ht 5' 11" (1 803 m)   Wt (!) 150 kg (330 lb 8 oz)   BMI 46 10 kg/m²      Physical Exam   Nursing note and vitals reviewed  Constitutional   General appearance: Abnormal   well developed and obese  Eyes No conjunctival pallor  Ears, Nose, Mouth, and Throat Oral mucosa moist    Pulmonary   Respiratory effort: No increased work of breathing or signs of respiratory distress  Auscultation of lungs: Clear to auscultation, equal breath sounds bilaterally, no wheezes, no rales, no rhonci  Cardiovascular   Auscultation of heart: Normal rate and rhythm, normal S1 and S2, without murmurs  Examination of extremities for edema and/or varicosities: 1+ edema  Abdomen   Abdomen: Abnormal   The abdomen was obese   Bowel sounds were normal  The abdomen was soft and nontender     Musculoskeletal   Gait and station: Normal     Psychiatric   Orientation to person, place and time: Normal     Affect: appropriate

## 2018-08-27 NOTE — PATIENT INSTRUCTIONS
Goals: Food log (ie ) www myfitnesspal com,sparkpeople  com,loseit com,calorieking  com,etc  baritastic  No sugary beverages  At least 64oz of water daily  Increase physical activity by 10 minutes daily   Gradually increase physical activity to a goal of 5 days per week for 30 minutes of MODERATE intensity PLUS 2 days per week of FULL BODY resistance training  Practice lesson plans 1-6 in bariatric manual  and Practice 30/60 rule  Please attend support group

## 2018-08-27 NOTE — ASSESSMENT & PLAN NOTE
+gastritis, small hiatal hernia  -continue PPI  -sx may improve following bariatric surgery  -discuss hiatal hernia w/ surgeon at final [facility]

## 2018-08-27 NOTE — ASSESSMENT & PLAN NOTE
Interested in Vertical Sleeve Gastrectomy vs RYGB (as of 6/6 weight check pt is most interested in RYGB) with Dr Chino Dutton    10% Weight loss-Not applicable   Screening labs-labs within acceptable limits for bariatric surgery  Support Group-Not Completed  Cardiac Risk Assessment-Completed: gastritis, small hiatal hernia - see letter 6/22/18  Upper Endoscopy-completed; H  Pylori- negative  Sleep Medicine Assessment-using CPAP  Alcohol and nicotine use is not recommended following bariatric surgery  NSAIDs should be avoided following bariatric surgery

## 2018-09-17 PROBLEM — Z99.89 OSA ON CPAP: Status: ACTIVE | Noted: 2018-09-17

## 2018-09-17 PROBLEM — G47.33 OSA ON CPAP: Status: ACTIVE | Noted: 2018-09-17

## 2018-09-17 PROBLEM — E66.01 MORBID OBESITY (HCC): Status: ACTIVE | Noted: 2018-09-17

## 2018-09-17 PROBLEM — I10 ESSENTIAL HYPERTENSION, BENIGN: Status: ACTIVE | Noted: 2018-09-17

## 2018-09-26 ENCOUNTER — ANESTHESIA EVENT (OUTPATIENT)
Dept: PERIOP | Facility: HOSPITAL | Age: 58
DRG: 621 | End: 2018-09-26
Payer: COMMERCIAL

## 2018-09-27 ENCOUNTER — OFFICE VISIT (OUTPATIENT)
Dept: BARIATRICS | Facility: CLINIC | Age: 58
End: 2018-09-27
Payer: COMMERCIAL

## 2018-09-27 VITALS
WEIGHT: 315 LBS | BODY MASS INDEX: 44.1 KG/M2 | TEMPERATURE: 99.1 F | DIASTOLIC BLOOD PRESSURE: 78 MMHG | HEART RATE: 90 BPM | HEIGHT: 71 IN | SYSTOLIC BLOOD PRESSURE: 130 MMHG

## 2018-09-27 DIAGNOSIS — E66.01 MORBID OBESITY WITH BODY MASS INDEX (BMI) OF 40.0 TO 49.9 (HCC): Primary | ICD-10-CM

## 2018-09-27 DIAGNOSIS — M54.5 LOW BACK PAIN, UNSPECIFIED BACK PAIN LATERALITY, UNSPECIFIED CHRONICITY, WITH SCIATICA PRESENCE UNSPECIFIED: ICD-10-CM

## 2018-09-27 DIAGNOSIS — G47.33 OSA ON CPAP: ICD-10-CM

## 2018-09-27 DIAGNOSIS — K21.9 GASTROESOPHAGEAL REFLUX DISEASE WITHOUT ESOPHAGITIS: ICD-10-CM

## 2018-09-27 DIAGNOSIS — I10 ESSENTIAL HYPERTENSION, BENIGN: ICD-10-CM

## 2018-09-27 DIAGNOSIS — Z99.89 OSA ON CPAP: ICD-10-CM

## 2018-09-27 PROCEDURE — 99213 OFFICE O/P EST LOW 20 MIN: CPT | Performed by: SURGERY

## 2018-09-27 RX ORDER — OMEGA-3 FATTY ACIDS/FISH OIL 300-1000MG
600 CAPSULE ORAL AS NEEDED
COMMUNITY
End: 2018-10-17 | Stop reason: HOSPADM

## 2018-09-27 RX ORDER — HEPARIN SODIUM 5000 [USP'U]/ML
5000 INJECTION, SOLUTION INTRAVENOUS; SUBCUTANEOUS
Status: CANCELLED | OUTPATIENT
Start: 2018-09-28 | End: 2018-09-29

## 2018-09-27 RX ORDER — MULTIVITAMIN
1 TABLET ORAL DAILY
COMMUNITY

## 2018-09-27 NOTE — PRE-PROCEDURE INSTRUCTIONS
Pre-Surgery Instructions:   Medication Instructions    Beclomethasone Dipropionate (QNASL) 80 MCG/ACT AERS Patient was instructed by Physician and understands   felodipine (PLENDIL) 10 MG 24 hr tablet Patient was instructed by Physician and understands   Ibuprofen (ADVIL) 200 MG CAPS Patient was instructed by Physician and understands   labetalol (NORMODYNE) 300 mg tablet Patient was instructed by Physician and understands   losartan (COZAAR) 50 mg tablet Patient was instructed by Physician and understands   Multiple Vitamin (MULTIVITAMIN) tablet Patient was instructed by Physician and understands   pantoprazole (PROTONIX) 40 mg tablet Patient was instructed by Physician and understands   sildenafil (VIAGRA) 50 MG tablet Patient was instructed by Physician and understands  Pt instructed to take protonix, labetalol and losartan with a small sip of water the morning of surgery  Pt given [facility] preop instructions and reviewed with pt  Pt given Chlorhexidine

## 2018-09-27 NOTE — ANESTHESIA PREPROCEDURE EVALUATION
Review of Systems/Medical History  Patient summary reviewed  Chart reviewed  No history of anesthetic complications     Cardiovascular  Hypertension controlled,    Pulmonary  Sleep apnea CPAP,        GI/Hepatic    GERD well controlled,        Negative  ROS        Endo/Other  Diabetes well controlled type 2 Diet controlled,   Obesity  morbid obesity   GYN       Hematology  Negative hematology ROS      Musculoskeletal  Negative musculoskeletal ROS Back pain , lumbar pain,        Neurology  Negative neurology ROS      Psychology   Depression ,              Physical Exam    Airway    Mallampati score: III  TM Distance: >3 FB  Neck ROM: full     Dental   Comment: No loose, No notable dental hx     Cardiovascular  Rhythm: regular, Rate: normal, Cardiovascular exam normal    Pulmonary  Pulmonary exam normal Breath sounds clear to auscultation,     Other Findings        Anesthesia Plan  ASA Score- 3     Anesthesia Type- general with ASA Monitors  Additional Monitors:   Airway Plan: ETT  Comment: SCOP patch ordered  Plan Factors- Patient instructed to abstain from smoking on day of procedure  Patient did not smoke on day of surgery  Induction- intravenous  Postoperative Plan- Plan for postoperative opioid use  Informed Consent- Anesthetic plan and risks discussed with patient

## 2018-09-27 NOTE — H&P
BARIATRIC H&P - BARIATRIC SURGERY  eKturah Miles 59 y o  male MRN: [id number]  Unit/Bed#:  Encounter: [id number]      HPI:  Keturah Miles is a 59 y o  male who presents with a long-standing history of morbid obesity  He was found to be a good candidate to undergo a bariatric operation upon [facility]  He is here today to discuss details of his surgery  Review of Systems   All other systems reviewed and are negative  GI:   Heartburn    Historical Information   Past Medical History:   Diagnosis Date    Benign hypertension     Calculus of gallbladder without cholecystitis without obstruction     CPAP (continuous positive airway pressure) dependence     GERD (gastroesophageal reflux disease)     H/O nonmelanoma skin cancer     Lumbar disc disease     Obesity     Pre-operative laboratory examination     Seasonal allergies     Sleep apnea     cpap    Wears glasses      Past Surgical History:   Procedure Laterality Date    BACK SURGERY      lumbar fusion    CARDIAC CATHETERIZATION      CHOLECYSTECTOMY      COLONOSCOPY      [place] EGD TRANSORAL BIOPSY SINGLE/MULTIPLE N/A 5/18/2018    Procedure: ESOPHAGOGASTRODUODENOSCOPY (EGD); Surgeon: Shayy Hutchins MD;  Location: [facility];   Service: General     Social History   History   Alcohol Use    Yes     Comment: occasional     History   Drug Use No     History   Smoking Status    Never Smoker   Smokeless Tobacco    Never Used     Family History: non-contributory    Meds/Allergies   all medications and allergies reviewed  No Known Allergies    Objective     Current Vitals:   Blood Pressure: 130/78 (09/27/18 1436)  Pulse: 90 (09/27/18 1436)  Temperature: 99 1 °F (37 3 °C) (09/27/18 1436)  Temp Source: Tympanic (09/27/18 1436)  Height: 5' 11" (180 3 cm) (09/27/18 1436)  Weight - Scale: (!) 149 kg (327 lb 8 oz) (09/27/18 1436)      Invasive Devices          No matching active lines, drains, or airways Physical Exam   Constitutional: He is oriented to person, place, and time  Vital signs are normal  He appears well-developed and well-nourished  No distress  HENT:   Head: Normocephalic and atraumatic  Nose: Nose normal    Mouth/Throat: Oropharynx is clear and moist    Eyes: Conjunctivae are normal  Right eye exhibits no discharge  No scleral icterus  Neck: Normal range of motion  Neck supple  Cardiovascular: Normal rate, regular rhythm, normal heart sounds and intact distal pulses  Pulmonary/Chest: Effort normal and breath sounds normal  No stridor  No respiratory distress  He has no wheezes  He has no rales  He exhibits no tenderness  Abdominal: Soft  Normal appearance and bowel sounds are normal  There is no tenderness  There is no rebound, no guarding and no CVA tenderness  Abdomen is obese  Benign  Well-healed laparoscopic incisions from previous gallbladder surgery   Musculoskeletal: Normal range of motion  Lymphadenopathy:     He has no cervical adenopathy  Neurological: He is alert and oriented to person, place, and time  Skin: Skin is warm and dry  No rash noted  He is not diaphoretic  No erythema  Psychiatric: He has a normal mood and affect  His behavior is normal  Judgment and thought content normal    Nursing note and vitals reviewed  Lab Results: I have personally reviewed pertinent lab results  Imaging: I have personally reviewed pertinent reports  EKG, Pathology, and Other Studies: I have personally reviewed pertinent reports  He is endoscopy revealed gastritis and small hiatal hernia  The biopsies revealed   A  Stomach, antrum biopsy:    -Benign gastric- oxyntoantral type mucosa with minimal superficial chronic inactive gastritis   -No evidence of Paneth cell metaplasia or atrophic gastritis   -No evidence of dysplasia or malignancy   -No H Pylori organisms identified on immunohistochemical stained slide   Control reacted appropriately    Assessment/PLAN:    59 y o  yo male morbidly obese found to be a good candidate to undergo a weight loss operation upon being enrolled here at the [facility]       Patient has a long history of morbid obesity and is presenting to discuss the surgical weight loss options  Despite the patient best efforts patient was unable to lose any meaningful or sustainable weight using nonsurgical means  We had a long discussion regarding all the surgical weight-loss options at our disposal at this point and reviewed the risks and benefits of each procedure in details as it relates to her age, BMI and medical conditions  He has been pre certified to undergo a Laparoscopic Jacki-en-Y gastric bypass possible sleeve gastrectomy  Here today to review his pre op test results  Has been medically cleared for the procedure     ++++++++++++++++++++++++++++  He has obstructive sleep apnea and wears a CPAP machine  ++++++++++++++++++++++++++++    I have discussed with him at length the risks and benefits of the operation and reiterated the components of our multidisciplinary program and the importance of compliance and follow up in the post operative period  Although there is a great statistical chance of improvement or even resolution of most of his associated comorbidities, the results vary from patient to patient and they largely depend on his commitment  The patient was also instructed with regards to the importance of behavior modification, nutritional counseling, support meeting attendance and lifestyle changes that are important to ensure success  He was given the opportunity to ask questions and I have answered all of them  I have addressed with the patient the level of CODE STATUS for this hospital stay and after explaining the different options currently he wishes to be a Level I  He understands and wishes to proceed        Still needs to lose 10 lbs prior to surgery  Will return next week for a weight check and if the weight loss is at least half the way there, the surgery date will not be re scheduled  Patient understands and agrees

## 2018-10-09 ENCOUNTER — TELEPHONE (OUTPATIENT)
Dept: BARIATRICS | Facility: CLINIC | Age: 58
End: 2018-10-09

## 2018-10-09 NOTE — TELEPHONE ENCOUNTER
Outreach phone call  No response but left message  How is patient coming along with liver shrinking diet? Any? or concerns? Please start Miralax product 3 days prior to surgery and read manual regarding this  If any ? or concerns please call the office and speak with staff member   NV

## 2018-10-09 NOTE — TELEPHONE ENCOUNTER
Patient returned phone call  He had a few questions: who should he speak with regarding spouse staying over night at the hospital  Also reminded patient about Miralax   I provided patient with contact # for coordinator Cabrera Simon who can provide information about the overnight at hospital  NV

## 2018-10-11 DIAGNOSIS — R10.10 PAIN OF UPPER ABDOMEN: Primary | ICD-10-CM

## 2018-10-11 RX ORDER — OXYCODONE HYDROCHLORIDE AND ACETAMINOPHEN 5; 325 MG/1; MG/1
1 TABLET ORAL EVERY 4 HOURS PRN
Qty: 20 TABLET | Refills: 0 | Status: SHIPPED | OUTPATIENT
Start: 2018-10-11 | End: 2018-10-17 | Stop reason: HOSPADM

## 2018-10-15 ENCOUNTER — HOSPITAL ENCOUNTER (INPATIENT)
Facility: HOSPITAL | Age: 58
LOS: 2 days | Discharge: HOME | DRG: 621 | End: 2018-10-17
Attending: SURGERY | Admitting: SURGERY
Payer: COMMERCIAL

## 2018-10-15 ENCOUNTER — ANESTHESIA (OUTPATIENT)
Dept: PERIOP | Facility: HOSPITAL | Age: 58
DRG: 621 | End: 2018-10-15
Payer: COMMERCIAL

## 2018-10-15 DIAGNOSIS — I10 ESSENTIAL HYPERTENSION, BENIGN: ICD-10-CM

## 2018-10-15 DIAGNOSIS — R10.10 PAIN OF UPPER ABDOMEN: ICD-10-CM

## 2018-10-15 DIAGNOSIS — E11.69 DIABETES MELLITUS TYPE 2 IN OBESE (HCC): ICD-10-CM

## 2018-10-15 DIAGNOSIS — E66.01 MORBID OBESITY WITH BODY MASS INDEX (BMI) OF 40.0 TO 49.9 (HCC): ICD-10-CM

## 2018-10-15 DIAGNOSIS — E66.9 DIABETES MELLITUS TYPE 2 IN OBESE (HCC): ICD-10-CM

## 2018-10-15 DIAGNOSIS — I10 ESSENTIAL (PRIMARY) HYPERTENSION: ICD-10-CM

## 2018-10-15 DIAGNOSIS — G47.33 OSA (OBSTRUCTIVE SLEEP APNEA): Primary | ICD-10-CM

## 2018-10-15 PROBLEM — Z98.84 S/P GASTRIC BYPASS: Status: ACTIVE | Noted: 2018-10-15

## 2018-10-15 LAB — GLUCOSE SERPL-MCNC: 163 MG/DL (ref 65–140)

## 2018-10-15 PROCEDURE — C9113 INJ PANTOPRAZOLE SODIUM, VIA: HCPCS | Performed by: SURGERY

## 2018-10-15 PROCEDURE — 99232 SBSQ HOSP IP/OBS MODERATE 35: CPT | Performed by: HOSPITALIST

## 2018-10-15 PROCEDURE — 0D1647A BYPASS STOMACH TO JEJUNUM WITH AUTOLOGOUS TISSUE SUBSTITUTE, PERCUTANEOUS ENDOSCOPIC APPROACH: ICD-10-PCS | Performed by: SURGERY

## 2018-10-15 PROCEDURE — 43644 LAP GASTRIC BYPASS/ROUX-EN-Y: CPT | Performed by: SURGERY

## 2018-10-15 PROCEDURE — 82948 REAGENT STRIP/BLOOD GLUCOSE: CPT

## 2018-10-15 PROCEDURE — 0DJ08ZZ INSPECTION OF UPPER INTESTINAL TRACT, VIA NATURAL OR ARTIFICIAL OPENING ENDOSCOPIC: ICD-10-PCS | Performed by: SURGERY

## 2018-10-15 DEVICE — SEAMGUARD STPL REINF ENDO GIA ULTRA UNIV 60 PURPLE: Type: IMPLANTABLE DEVICE | Site: ABDOMEN | Status: FUNCTIONAL

## 2018-10-15 RX ORDER — PROMETHAZINE HYDROCHLORIDE 25 MG/ML
12.5 INJECTION, SOLUTION INTRAMUSCULAR; INTRAVENOUS EVERY 4 HOURS PRN
Status: DISCONTINUED | OUTPATIENT
Start: 2018-10-15 | End: 2018-10-15 | Stop reason: HOSPADM

## 2018-10-15 RX ORDER — MORPHINE SULFATE 4 MG/ML
4 INJECTION, SOLUTION INTRAMUSCULAR; INTRAVENOUS EVERY 2 HOUR PRN
Status: DISCONTINUED | OUTPATIENT
Start: 2018-10-15 | End: 2018-10-17 | Stop reason: HOSPADM

## 2018-10-15 RX ORDER — MAGNESIUM HYDROXIDE 1200 MG/15ML
LIQUID ORAL AS NEEDED
Status: DISCONTINUED | OUTPATIENT
Start: 2018-10-15 | End: 2018-10-15 | Stop reason: HOSPADM

## 2018-10-15 RX ORDER — ONDANSETRON 2 MG/ML
INJECTION INTRAMUSCULAR; INTRAVENOUS AS NEEDED
Status: DISCONTINUED | OUTPATIENT
Start: 2018-10-15 | End: 2018-10-15 | Stop reason: SURG

## 2018-10-15 RX ORDER — OXYCODONE HCL 5 MG/5 ML
5 SOLUTION, ORAL ORAL EVERY 4 HOURS PRN
Status: DISCONTINUED | OUTPATIENT
Start: 2018-10-15 | End: 2018-10-17 | Stop reason: HOSPADM

## 2018-10-15 RX ORDER — PROMETHAZINE HYDROCHLORIDE 25 MG/ML
12.5 INJECTION, SOLUTION INTRAMUSCULAR; INTRAVENOUS EVERY 6 HOURS PRN
Status: DISCONTINUED | OUTPATIENT
Start: 2018-10-15 | End: 2018-10-17 | Stop reason: HOSPADM

## 2018-10-15 RX ORDER — METOCLOPRAMIDE HYDROCHLORIDE 5 MG/ML
10 INJECTION INTRAMUSCULAR; INTRAVENOUS EVERY 6 HOURS PRN
Status: DISCONTINUED | OUTPATIENT
Start: 2018-10-15 | End: 2018-10-17 | Stop reason: HOSPADM

## 2018-10-15 RX ORDER — SODIUM CHLORIDE 9 MG/ML
125 INJECTION, SOLUTION INTRAVENOUS CONTINUOUS
Status: DISCONTINUED | OUTPATIENT
Start: 2018-10-15 | End: 2018-10-15

## 2018-10-15 RX ORDER — MIDAZOLAM HYDROCHLORIDE 1 MG/ML
INJECTION INTRAMUSCULAR; INTRAVENOUS AS NEEDED
Status: DISCONTINUED | OUTPATIENT
Start: 2018-10-15 | End: 2018-10-15 | Stop reason: SURG

## 2018-10-15 RX ORDER — LIDOCAINE HYDROCHLORIDE 10 MG/ML
INJECTION, SOLUTION INFILTRATION; PERINEURAL AS NEEDED
Status: DISCONTINUED | OUTPATIENT
Start: 2018-10-15 | End: 2018-10-15 | Stop reason: SURG

## 2018-10-15 RX ORDER — OXYCODONE HCL 5 MG/5 ML
10 SOLUTION, ORAL ORAL EVERY 4 HOURS PRN
Status: DISCONTINUED | OUTPATIENT
Start: 2018-10-15 | End: 2018-10-17 | Stop reason: HOSPADM

## 2018-10-15 RX ORDER — SODIUM CHLORIDE, SODIUM LACTATE, POTASSIUM CHLORIDE, CALCIUM CHLORIDE 600; 310; 30; 20 MG/100ML; MG/100ML; MG/100ML; MG/100ML
75 INJECTION, SOLUTION INTRAVENOUS CONTINUOUS
Status: DISCONTINUED | OUTPATIENT
Start: 2018-10-15 | End: 2018-10-17

## 2018-10-15 RX ORDER — LOSARTAN POTASSIUM 25 MG/1
25 TABLET ORAL DAILY
Status: DISCONTINUED | OUTPATIENT
Start: 2018-10-15 | End: 2018-10-15

## 2018-10-15 RX ORDER — ACETAMINOPHEN 160 MG/5ML
325 SUSPENSION, ORAL (FINAL DOSE FORM) ORAL EVERY 4 HOURS PRN
Status: DISCONTINUED | OUTPATIENT
Start: 2018-10-15 | End: 2018-10-17 | Stop reason: HOSPADM

## 2018-10-15 RX ORDER — ROCURONIUM BROMIDE 10 MG/ML
INJECTION, SOLUTION INTRAVENOUS AS NEEDED
Status: DISCONTINUED | OUTPATIENT
Start: 2018-10-15 | End: 2018-10-15 | Stop reason: SURG

## 2018-10-15 RX ORDER — METOPROLOL TARTRATE 5 MG/5ML
5 INJECTION INTRAVENOUS EVERY 6 HOURS PRN
Status: DISCONTINUED | OUTPATIENT
Start: 2018-10-15 | End: 2018-10-16

## 2018-10-15 RX ORDER — LOSARTAN POTASSIUM 25 MG/1
25 TABLET ORAL DAILY
Status: DISCONTINUED | OUTPATIENT
Start: 2018-10-15 | End: 2018-10-16

## 2018-10-15 RX ORDER — BUPIVACAINE HYDROCHLORIDE AND EPINEPHRINE 5; 5 MG/ML; UG/ML
INJECTION, SOLUTION PERINEURAL AS NEEDED
Status: DISCONTINUED | OUTPATIENT
Start: 2018-10-15 | End: 2018-10-15 | Stop reason: HOSPADM

## 2018-10-15 RX ORDER — HYDROMORPHONE HYDROCHLORIDE 2 MG/ML
INJECTION, SOLUTION INTRAMUSCULAR; INTRAVENOUS; SUBCUTANEOUS AS NEEDED
Status: DISCONTINUED | OUTPATIENT
Start: 2018-10-15 | End: 2018-10-15 | Stop reason: SURG

## 2018-10-15 RX ORDER — HYDROMORPHONE HCL/PF 1 MG/ML
0.5 SYRINGE (ML) INJECTION
Status: DISCONTINUED | OUTPATIENT
Start: 2018-10-15 | End: 2018-10-15 | Stop reason: HOSPADM

## 2018-10-15 RX ORDER — FENTANYL CITRATE 50 UG/ML
INJECTION, SOLUTION INTRAMUSCULAR; INTRAVENOUS AS NEEDED
Status: DISCONTINUED | OUTPATIENT
Start: 2018-10-15 | End: 2018-10-15 | Stop reason: SURG

## 2018-10-15 RX ORDER — HEPARIN SODIUM 5000 [USP'U]/ML
5000 INJECTION, SOLUTION INTRAVENOUS; SUBCUTANEOUS
Status: COMPLETED | OUTPATIENT
Start: 2018-10-15 | End: 2018-10-15

## 2018-10-15 RX ORDER — ONDANSETRON 2 MG/ML
4 INJECTION INTRAMUSCULAR; INTRAVENOUS EVERY 4 HOURS PRN
Status: DISCONTINUED | OUTPATIENT
Start: 2018-10-15 | End: 2018-10-17 | Stop reason: HOSPADM

## 2018-10-15 RX ORDER — GLYCOPYRROLATE 0.2 MG/ML
INJECTION INTRAMUSCULAR; INTRAVENOUS AS NEEDED
Status: DISCONTINUED | OUTPATIENT
Start: 2018-10-15 | End: 2018-10-15 | Stop reason: SURG

## 2018-10-15 RX ORDER — METOPROLOL TARTRATE 5 MG/5ML
5 INJECTION INTRAVENOUS EVERY 6 HOURS
Status: DISCONTINUED | OUTPATIENT
Start: 2018-10-15 | End: 2018-10-15

## 2018-10-15 RX ORDER — PROPOFOL 10 MG/ML
INJECTION, EMULSION INTRAVENOUS AS NEEDED
Status: DISCONTINUED | OUTPATIENT
Start: 2018-10-15 | End: 2018-10-15 | Stop reason: SURG

## 2018-10-15 RX ORDER — ACETAMINOPHEN 160 MG/5ML
320 SUSPENSION, ORAL (FINAL DOSE FORM) ORAL EVERY 4 HOURS PRN
Status: DISCONTINUED | OUTPATIENT
Start: 2018-10-15 | End: 2018-10-17 | Stop reason: HOSPADM

## 2018-10-15 RX ORDER — DEXAMETHASONE SODIUM PHOSPHATE 4 MG/ML
INJECTION, SOLUTION INTRA-ARTICULAR; INTRALESIONAL; INTRAMUSCULAR; INTRAVENOUS; SOFT TISSUE AS NEEDED
Status: DISCONTINUED | OUTPATIENT
Start: 2018-10-15 | End: 2018-10-15 | Stop reason: SURG

## 2018-10-15 RX ORDER — METOPROLOL TARTRATE 5 MG/5ML
5 INJECTION INTRAVENOUS EVERY 6 HOURS PRN
Status: DISCONTINUED | OUTPATIENT
Start: 2018-10-15 | End: 2018-10-15

## 2018-10-15 RX ORDER — ONDANSETRON 2 MG/ML
4 INJECTION INTRAMUSCULAR; INTRAVENOUS EVERY 6 HOURS PRN
Status: DISCONTINUED | OUTPATIENT
Start: 2018-10-15 | End: 2018-10-15 | Stop reason: HOSPADM

## 2018-10-15 RX ORDER — PANTOPRAZOLE SODIUM 40 MG/1
40 INJECTION, POWDER, FOR SOLUTION INTRAVENOUS
Status: DISCONTINUED | OUTPATIENT
Start: 2018-10-15 | End: 2018-10-17 | Stop reason: HOSPADM

## 2018-10-15 RX ORDER — FENTANYL CITRATE 50 UG/ML
50 INJECTION, SOLUTION INTRAMUSCULAR; INTRAVENOUS
Status: DISCONTINUED | OUTPATIENT
Start: 2018-10-15 | End: 2018-10-15 | Stop reason: HOSPADM

## 2018-10-15 RX ORDER — SCOLOPAMINE TRANSDERMAL SYSTEM 1 MG/1
1 PATCH, EXTENDED RELEASE TRANSDERMAL ONCE AS NEEDED
Status: DISCONTINUED | OUTPATIENT
Start: 2018-10-15 | End: 2018-10-15

## 2018-10-15 RX ORDER — HYDRALAZINE HYDROCHLORIDE 20 MG/ML
10 INJECTION INTRAMUSCULAR; INTRAVENOUS EVERY 6 HOURS PRN
Status: DISCONTINUED | OUTPATIENT
Start: 2018-10-15 | End: 2018-10-17 | Stop reason: HOSPADM

## 2018-10-15 RX ADMIN — PANTOPRAZOLE SODIUM 40 MG: 40 INJECTION, POWDER, FOR SOLUTION INTRAVENOUS at 14:56

## 2018-10-15 RX ADMIN — METRONIDAZOLE 500 MG: 500 INJECTION, SOLUTION INTRAVENOUS at 18:46

## 2018-10-15 RX ADMIN — SCOPALAMINE 1 PATCH: 1 PATCH, EXTENDED RELEASE TRANSDERMAL at 07:50

## 2018-10-15 RX ADMIN — ROCURONIUM BROMIDE 50 MG: 10 INJECTION INTRAVENOUS at 09:37

## 2018-10-15 RX ADMIN — CEFAZOLIN SODIUM 2000 MG: 2 SOLUTION INTRAVENOUS at 09:49

## 2018-10-15 RX ADMIN — HEPARIN SODIUM 5000 UNITS: 5000 INJECTION INTRAVENOUS; SUBCUTANEOUS at 08:30

## 2018-10-15 RX ADMIN — MORPHINE SULFATE 4 MG: 4 INJECTION, SOLUTION INTRAMUSCULAR; INTRAVENOUS at 14:56

## 2018-10-15 RX ADMIN — HYDROMORPHONE HYDROCHLORIDE 1 MG: 2 INJECTION INTRAMUSCULAR; INTRAVENOUS; SUBCUTANEOUS at 12:05

## 2018-10-15 RX ADMIN — MORPHINE SULFATE 4 MG: 4 INJECTION, SOLUTION INTRAMUSCULAR; INTRAVENOUS at 17:29

## 2018-10-15 RX ADMIN — HYDROMORPHONE HYDROCHLORIDE 0.5 MG: 1 INJECTION, SOLUTION INTRAMUSCULAR; INTRAVENOUS; SUBCUTANEOUS at 12:51

## 2018-10-15 RX ADMIN — FENTANYL CITRATE 50 MCG: 50 INJECTION, SOLUTION INTRAMUSCULAR; INTRAVENOUS at 12:40

## 2018-10-15 RX ADMIN — PROPOFOL 200 MG: 10 INJECTION, EMULSION INTRAVENOUS at 09:37

## 2018-10-15 RX ADMIN — DEXAMETHASONE SODIUM PHOSPHATE 4 MG: 4 INJECTION, SOLUTION INTRAMUSCULAR; INTRAVENOUS at 09:54

## 2018-10-15 RX ADMIN — FENTANYL CITRATE 100 MCG: 50 INJECTION, SOLUTION INTRAMUSCULAR; INTRAVENOUS at 09:29

## 2018-10-15 RX ADMIN — SODIUM CHLORIDE, SODIUM LACTATE, POTASSIUM CHLORIDE, AND CALCIUM CHLORIDE 125 ML/HR: .6; .31; .03; .02 INJECTION, SOLUTION INTRAVENOUS at 20:26

## 2018-10-15 RX ADMIN — METOPROLOL TARTRATE 5 MG: 5 INJECTION, SOLUTION INTRAVENOUS at 14:56

## 2018-10-15 RX ADMIN — HYDRALAZINE HYDROCHLORIDE 10 MG: 20 INJECTION INTRAMUSCULAR; INTRAVENOUS at 17:28

## 2018-10-15 RX ADMIN — LIDOCAINE HYDROCHLORIDE 80 MG: 10 INJECTION, SOLUTION INFILTRATION; PERINEURAL at 09:37

## 2018-10-15 RX ADMIN — OXYCODONE HYDROCHLORIDE 5 MG: 5 SOLUTION ORAL at 20:59

## 2018-10-15 RX ADMIN — CEFAZOLIN SODIUM 2000 MG: 2 SOLUTION INTRAVENOUS at 17:29

## 2018-10-15 RX ADMIN — ONDANSETRON HYDROCHLORIDE 4 MG: 2 INJECTION, SOLUTION INTRAVENOUS at 11:28

## 2018-10-15 RX ADMIN — ACETAMINOPHEN 325 MG: 160 SUSPENSION ORAL at 20:59

## 2018-10-15 RX ADMIN — SODIUM CHLORIDE: 0.9 INJECTION, SOLUTION INTRAVENOUS at 10:01

## 2018-10-15 RX ADMIN — SODIUM CHLORIDE 125 ML/HR: 0.9 INJECTION, SOLUTION INTRAVENOUS at 07:49

## 2018-10-15 RX ADMIN — SODIUM CHLORIDE, SODIUM LACTATE, POTASSIUM CHLORIDE, AND CALCIUM CHLORIDE 125 ML/HR: .6; .31; .03; .02 INJECTION, SOLUTION INTRAVENOUS at 12:26

## 2018-10-15 RX ADMIN — FENTANYL CITRATE 50 MCG: 50 INJECTION, SOLUTION INTRAMUSCULAR; INTRAVENOUS at 12:30

## 2018-10-15 RX ADMIN — HYDROMORPHONE HYDROCHLORIDE 1 MG: 2 INJECTION INTRAMUSCULAR; INTRAVENOUS; SUBCUTANEOUS at 10:00

## 2018-10-15 RX ADMIN — NEOSTIGMINE METHYLSULFATE 3 MG: 1 INJECTION, SOLUTION INTRAMUSCULAR; INTRAVENOUS; SUBCUTANEOUS at 11:51

## 2018-10-15 RX ADMIN — ROCURONIUM BROMIDE 10 MG: 10 INJECTION INTRAVENOUS at 10:39

## 2018-10-15 RX ADMIN — MIDAZOLAM 2 MG: 1 INJECTION INTRAMUSCULAR; INTRAVENOUS at 09:23

## 2018-10-15 RX ADMIN — ROCURONIUM BROMIDE 20 MG: 10 INJECTION INTRAVENOUS at 10:20

## 2018-10-15 RX ADMIN — GLYCOPYRROLATE 0.4 MG: 0.2 INJECTION, SOLUTION INTRAMUSCULAR; INTRAVENOUS at 11:51

## 2018-10-15 RX ADMIN — METRONIDAZOLE 500 MG: 500 INJECTION, SOLUTION INTRAVENOUS at 09:49

## 2018-10-15 NOTE — ASSESSMENT & PLAN NOTE
Lab Results   Component Value Date    HGBA1C 5 5 05/02/2018       Recent Labs      10/15/18   1242   POCGLU  163*       Blood Sugar Average: Last 72 hrs:  (P) 163   · Patient denies any history of diabetes  · A1c 6 6 in January 2018, at 5 5 in May 2018  · Will recheck A1c in AM

## 2018-10-15 NOTE — ANESTHESIA POSTPROCEDURE EVALUATION
Post-Op Assessment Note      CV Status:  Stable    Mental Status:  Alert and awake    Hydration Status:  Euvolemic    PONV Controlled:  Controlled    Airway Patency:  Patent    Post Op Vitals Reviewed:  Yes              BP (!) 174/90 (10/15/18 1345)    Temp 97 9 °F (36 6 °C) (10/15/18 1345)    Pulse 80 (10/15/18 1345)   Resp 18 (10/15/18 1345)    SpO2 96 % (10/15/18 1345)

## 2018-10-15 NOTE — OP NOTE
[facility]   [address] [facility], [address]  [phone] (Fax)      Operative Report  LAPAROSCOPIC PRISCILLA-EN-Y GASTRIC BYPASS AND INTRAOPERATIVE EGD     Patient Name: Joe Best    :  1960  MRN: [id number]  Patient Location: [facility] ROOM 08  Surgery Date : 10/15/2018  Surgeons:  Surgeon(s) and Role:     * Rock Bowman MD - Primary     * Kaylie Freeman MD - [address] [place] [address]  Diagnosis:    Pre-Op Diagnosis Codes: Morbid obesity ([facility] [address] ) [E66 01]  Body mass index is 43 65 kg/m²  Essential hypertension, benign [I10]  SALAS on CPAP [G47 33, Z99 89]    Post-Op Diagnosis Codes:     * Morbid obesity ([facility] [address] ) [E66 01]     * Body mass index is 43 65 kg/m²  * Essential hypertension, benign [I10]     * SALAS on CPAP [G47 33, Z99 89]    Procedure  1  Laparoscopic Priscilla-en-Y Gastric Bypass  2  Intraoperative Endoscopy    Specimen(s):  * No specimens in log *    Estimated Blood Loss:    30 mL   Closed/Suction Drain Midline Abdomen Bulb 19 Fr  (Active)   Dressing Status Clean;Dry; Intact 10/15/2018 11:44 AM   Status To bulb suction 10/15/2018 11:44 AM   Number of days: 0       Anesthesia Type:     General    Operative Indications: Morbid obesity ([facility] ) [E66 01]  Essential hypertension, benign [I10]  SALAS on CPAP [G47 33, Z99 89]  Body mass index is 43 65 kg/m²  Operative Findings:    Large in prominent intra-abdominal omentum and enlarged left lobe of the liver    Complications:     None    Procedure and Technique:    INDICATION:    Joe Best is a 59 y o  male with a Body mass index is 43 65 kg/m²  and a long standing history of morbid obesity and inability to lose a significant amount of weight on its own  This patient was found to be a good candidate to undergo a bariatric procedure upon being enrolled here at the [address] [place] [address]      OPERATIVE TECHNIQUE    The patient was taken to the operating room and placed in a supine position  A dose of IV antibiotic prophylaxis that consisted of Ancef 2g and Metronidazole 500mg was given  Also, 5000 units of subcutaneous unfractionated heparin to prevent DVT were administered  Sequential compression devices were placed on both lower extremities  After satisfactory general anesthesia induction and endotracheal intubation was achieved, the extremities were secured to prevent neurovascular and musculoskeletal injuries as best as possible  Subsequently, the abdominal wall was prepped and draped in a surgical standard sterile fashion  After a timeout was done and the patient was properly identified and the type of procedure was confirmed a supra-umbilical transverse skin incision was made, and the subcutaneous tissues dissected  Access to the peritoneal cavity was gained with an optical trocar  With this device, we were able to visualize the layers of the abdominal wall, and enter the peritoneal cavity under direct visualization  Pneumoperitoneum was then established with CO2 insufflation  Under direct laparoscopic visualization, four additional trocars were placed: a 12 mm in the right upper quadrant subcostal position in the anterior axillary line, a 12-mm port was placed in the right flank midclavicular line, a 12-mm port was placed in the left upper quadrant subcostal position in the midclavicular line and another 12-mm port was placed in the left quadrant anterior axillary line lateral to the supraumbilical port  With the trocars in place, the dissection was begun  The omentum of the transverse colon was identified and elevated, this allowed for the ligament of Treitz to be visualized  The small bowel was run about 60 cm to a point distal from the ligament of Treitz and was divided with a stapler and a 60 mm cartridge  The Jacki limb was then measured at 100 cm, and the 100 cm oscar was brought in side-to-side opposition to the biliopancreatic limb   A side-to-side jejunojejunostomy was then created  This was accomplished by first making an antimesenteric enterotomy with cautery energy device  We then positioned the laparoscopic stapler with a 60-mm cartridge within the lumen of the bowel to create a stapled side-to-side anastomosis  The enterotomy was then approximated with a 2-0 silk suture, subsequently elevated and the stapler was then reloaded and positioned perpendicularly to the first staple lines, just below the margin of the enterotomy on the antimesenteric border of the bowel and closed transversely utilizing an additional 60-mm cartridge  The resulting mesenteric defect was then closed with a running nonabsorbable suture  A Brolin stitch was placed to prevent kinking  At this point we repositioned the patient into a reverse Trendelenburg and the Mirta Sine liver retractor was placed in the subxiphoid position through the use of a 5-mm trocar incision  The left lobe of the liver was significantly enlarged and prominent  Visualization was challenging  We then turned our attention to the gastroesophageal junction  The left ernestina was skeletonized dissecting at the angle of His  The pars flaccida was identified and incised  The lesser sac was entered below the lesser curve at the level just inferior to the take off of the left gastric artery  The left gastric artery and hepatic vagal branches were preserved  We then created a 30 cc gastric pouch  To accomplish this, serial firings of a laparoscopic stapler 60-mm cartridge were utilized  The staple lines were reinforced with a butressing material  We created a pouch based on the lesser curve and in vertical orientation  This was accomplished by a  transverse firing of the stapler along the inferior edge of the pouch and then vertical serial firings of the stapler to the angle of His  This completely  the pouch from the gastric remnant   A 25 mm circular stapler anvil was passed through the mouth and into the esophagus and subsequently placed within the pouch  The inferior edge of the pouch was then opened with cautery and the anvil stem was brought through the anterior aspect of the pouch close to the staple line  We proceeded to divide the omentum all the way to the transverse colon  The omentum was very large, prominent and heavy  The end of the Jacki limb was opened with the cautery and the circular stapling device was brought through the dilated left upper quadrant 12-mm port site, and passed through the open end of the Jacki limb  The Jacki limb was then passed in a antecolic and antegastric position to the pouch  This was accomplished without tension and without twist   The stem of the stapling device was then brought through the antimesenteric border of the Jacki limb adjacent to the pouch  The stem and the anvil were united and the stapler was fired  This created an end-to-side gastrojejunostomy  The excess Jacki limb proximal to the anastomosis was then resected with the cautery energy device and a laparoscopic stapler with a 60-mm cartridge  The anastomosis was reinforced with interrupted absorbable sutures at the intersection of the staple lines and an additional Lembert type stitch anteriorly  The distal Jacki limb was occluded and an EGD as well as an air insufflation test were performed  No intraoperative bleeding nor leaks were detected  Given the challenging visualization and enlarged omentum I decided to leave a 23 [place] Arielle Aleksey drain posterior to the anastomosis with the tip oriented towards the spleen  I then covered the G-J anastomosis with a tongue of omentum in a Ben patch fashion and secured it in place with a single 2/0 Vicryl stitch  The sponge, needle and instrument count was reported complete      The 12-mm port site on the left flank that was dilated for the circular stapler as well as the Visiport trocar were then closed with the use of a suture closure device and a 0 absorbable suture  The liver retractor was removed under direct laparoscopic visualization, and no bleeding was noted  The remaining ports were then also removed under laparoscopic visualization  The skin incisions were all closed with 4-0 absorbable subcuticular suture  The patient tolerated the procedure well, was extubated uneventfully and was transferred to the recovery room in stable condition  I was present for the entire length of the procedure as the attending of record  No qualified resident was available to assist   The presence of an assistant was necessary for camera holding, traction and counter traction and for help with suturing and stapling in addition to performing the intraop-EGD        Patient Disposition:    PACU     Signature: Aida Rainey MD  Date: October 15, 2018  Time: 12:01 PM

## 2018-10-15 NOTE — ASSESSMENT & PLAN NOTE
· Patient on Losartan 50 mg daily, Labetolol 300 mg BID, and Felodipine 10 mg daily at home  · , heart rate 90's  · Add Losartan 25 mg daily (hold for SBP <150)  · Add Lopressor 5 mg IV every 6 hours as needed for SBP >150  · Continue to monitor BP

## 2018-10-15 NOTE — CONSULTS
[address] Internal Medicine  Consult- Kati Billy 1960, 59 y o  male MRN: [id number]    Unit/Bed#: E5 -01 Encounter: [id number]    Primary Care Provider: Cinthia Martinez MD   Date and time admitted to hospital: 10/15/2018  7:16 AM      Consults Internal Medicine    * S/P gastric bypass   Assessment & Plan    · Managed by surgery  · Will start clear liquids at 1800 today  · Up walking in hallway  · Pain controlled  · Incentive spirometry at bedside     SALAS on CPAP   Assessment & Plan    · CPAP ordered for bedtime  · Patient has his own machine  · Incentive spirometry encouraged     Essential hypertension, benign   Assessment & Plan    · Patient on Losartan 50 mg daily, Labetolol 300 mg BID, and Felodipine 10 mg daily at home  · , heart rate 90's  · Add Losartan 25 mg daily (hold for SBP <150)  · Add Lopressor 5 mg IV every 6 hours as needed for SBP >150  · Continue to monitor BP     Morbid obesity ([facility] [address] )   Assessment & Plan    · BMI 43 7  · S/p Jacki-en-Y gastric bypass surgery     Diabetes mellitus type 2 in obese [facility])   Assessment & Plan    Lab Results   Component Value Date    HGBA1C 5 5 05/02/2018       Recent Labs      10/15/18   1242   POCGLU  163*       Blood Sugar Average: Last 72 hrs:  (P) 163   · Patient denies any history of diabetes  · A1c 6 6 in January 2018, at 5 5 in May 2018  · Will recheck A1c in AM           VTE Prophylaxis: Sequential compression device (Venodyne)   / sequential compression device     Recommendations for Discharge:  · Discharge on reduced doses of antihypertensives and have patient monitor BP at home  Crush pills as per surgery recommendations  Hold BP meds for SBP <150  · The patient is not on any diabetes medications  An A1c is pending  · Continue normal CPAP for bedtime  Counseling / Coordination of Care Time: 30 minutes  Greater than 50% of total time spent on patient counseling and coordination of care  Collaboration of Care:  Were Recommendations Directly Discussed with Primary Treatment Team? - No     History of Present Illness:    Kati Billy is a 59 y o  male who is originally admitted to the General Surgery service due to a scheduled procedure for a Jacki-en-Y gastric bypass surgery  We are consulted for the management of his sleep apnea, blood pressure and diabetes  The patient underwent his surgical procedure today without incidence  He is already up ambulating in the hallway and his pain is controlled  The patient has a history of hypertension and is maintained on triple therapy with Labetolol, Felodipine, and Losartan  The patient was found to be hypertensive post-operatively  We will resume his Losartan at a reduced dose of 25 mg daily  Lopressor 5 mg IV is ordered every 6 hours as needed for SBP >150  His blood pressure will continue to be monitored  He has a history of sleep apnea and is maintained on home CPAP therapy  He has brought his CPAP with him and will utilize this while here  An incentive spirometer is ordered and he was encouraged to use it  He denies any history of diabetes, only being told he was "borderline" at some point  He has never been medicated for this  Currently, his last A1c was 5 5 in May  We will re-evaluate this in the AM as well as a CBC / BMP  He has no other complaints  Review of Systems:    Review of Systems   Constitutional: Negative for activity change, appetite change, fatigue and fever  HENT: Negative for congestion and sore throat  Respiratory: Negative for cough and shortness of breath  Cardiovascular: Negative for chest pain and palpitations  History of cardiac cath in the past with no intervention needed  Gastrointestinal: Positive for abdominal pain  Negative for nausea and vomiting  S/p cholecystectomy and recent EGD (with gastritis and hiatal hernia)  Genitourinary: Negative for difficulty urinating          Reports urinating a lot with IV fluids  Musculoskeletal: Negative  History of lumbar spine surgery  Skin: Negative  Neurological: Negative  Psychiatric/Behavioral: Negative  Past Medical and Surgical History:     Past Medical History:   Diagnosis Date    Benign hypertension     Calculus of gallbladder without cholecystitis without obstruction     CPAP (continuous positive airway pressure) dependence     GERD (gastroesophageal reflux disease)     H/O nonmelanoma skin cancer     Lumbar disc disease     Obesity     Pre-operative laboratory examination     Seasonal allergies     Sleep apnea     cpap    Wears glasses        Past Surgical History:   Procedure Laterality Date    BACK SURGERY      lumbar fusion    CARDIAC CATHETERIZATION      CHOLECYSTECTOMY      COLONOSCOPY      TX EGD TRANSORAL BIOPSY SINGLE/MULTIPLE N/A 5/18/2018    Procedure: ESOPHAGOGASTRODUODENOSCOPY (EGD); Surgeon: Corinne Blake, MD;  Location: [facility]; Service: General       Meds/Allergies:    all medications and allergies reviewed    Allergies: No Known Allergies    Social History:     Marital Status: /Civil Union    Substance Use History:   History   Alcohol Use    Yes     Comment: occasional     History   Smoking Status    Never Smoker   Smokeless Tobacco    Never Used     History   Drug Use No       Family History:    non-contributory    Physical Exam:     Vitals:   Blood Pressure: 168/78 (10/15/18 1625)  Pulse: 82 (10/15/18 1520)  Temperature: (!) 97 2 °F (36 2 °C) (10/15/18 1520)  Temp Source: Temporal (10/15/18 1520)  Respirations: 18 (10/15/18 1520)  Height: 5' 11" (180 3 cm) (10/15/18 0739)  Weight - Scale: (!) 142 kg (313 lb) (10/15/18 0739)  SpO2: 95 % (10/15/18 1520)    Physical Exam   Constitutional: He is oriented to person, place, and time  He appears well-developed and well-nourished  HENT:   Head: Normocephalic and atraumatic  Eyes: Conjunctivae are normal  No scleral icterus     Cardiovascular: Normal rate, regular rhythm and normal heart sounds  No murmur heard  Pulmonary/Chest: Effort normal and breath sounds normal  No respiratory distress  He has no wheezes  He has no rales  Abdominal: Soft  He exhibits distension  There is tenderness  Mildly distended with hypoactive bowel sounds  Mild tenderness  Musculoskeletal: He exhibits no edema or tenderness  Neurological: He is alert and oriented to person, place, and time  Skin: Skin is warm and dry  Psychiatric: He has a normal mood and affect  His behavior is normal            Additional Data:     Lab Results: I have personally reviewed pertinent reports  Lab Results   Component Value Date/Time    HGBA1C 5 5 05/02/2018       Results from last 7 days  Lab Units 10/15/18  1242   POC GLUCOSE mg/dl 163*           Imaging: I have personally reviewed pertinent reports  No orders to display       EKG, Pathology, and Other Studies Reviewed on Admission:   · EKG: None    ** Please Note: This note has been constructed using a voice recognition system   **

## 2018-10-15 NOTE — H&P (VIEW-ONLY)
BARIATRIC H&P - BARIATRIC SURGERY  Zully Patrick 59 y o  male MRN: [id number]  Unit/Bed#:  Encounter: [id number]      HPI:  Zully Patrick is a 59 y o  male who presents with a long-standing history of morbid obesity  He was found to be a good candidate to undergo a bariatric operation upon [facility]  He is here today to discuss details of his surgery  Review of Systems   All other systems reviewed and are negative  GI:   Heartburn    Historical Information   Past Medical History:   Diagnosis Date    Benign hypertension     Calculus of gallbladder without cholecystitis without obstruction     CPAP (continuous positive airway pressure) dependence     GERD (gastroesophageal reflux disease)     H/O nonmelanoma skin cancer     Lumbar disc disease     Obesity     Pre-operative laboratory examination     Seasonal allergies     Sleep apnea     cpap    Wears glasses      Past Surgical History:   Procedure Laterality Date    BACK SURGERY      lumbar fusion    CARDIAC CATHETERIZATION      CHOLECYSTECTOMY      COLONOSCOPY      DC EGD TRANSORAL BIOPSY SINGLE/MULTIPLE N/A 5/18/2018    Procedure: ESOPHAGOGASTRODUODENOSCOPY (EGD); Surgeon: Kenia Ramírez MD;  Location: [facility];   Service: General     Social History   History   Alcohol Use    Yes     Comment: occasional     History   Drug Use No     History   Smoking Status    Never Smoker   Smokeless Tobacco    Never Used     Family History: non-contributory    Meds/Allergies   all medications and allergies reviewed  No Known Allergies    Objective     Current Vitals:   Blood Pressure: 130/78 (09/27/18 1436)  Pulse: 90 (09/27/18 1436)  Temperature: 99 1 °F (37 3 °C) (09/27/18 1436)  Temp Source: Tympanic (09/27/18 1436)  Height: 5' 11" (180 3 cm) (09/27/18 1436)  Weight - Scale: (!) 149 kg (327 lb 8 oz) (09/27/18 1436)      Invasive Devices          No matching active lines, drains, or airways Physical Exam   Constitutional: He is oriented to person, place, and time  Vital signs are normal  He appears well-developed and well-nourished  No distress  HENT:   Head: Normocephalic and atraumatic  Nose: Nose normal    Mouth/Throat: Oropharynx is clear and moist    Eyes: Conjunctivae are normal  Right eye exhibits no discharge  No scleral icterus  Neck: Normal range of motion  Neck supple  Cardiovascular: Normal rate, regular rhythm, normal heart sounds and intact distal pulses  Pulmonary/Chest: Effort normal and breath sounds normal  No stridor  No respiratory distress  He has no wheezes  He has no rales  He exhibits no tenderness  Abdominal: Soft  Normal appearance and bowel sounds are normal  There is no tenderness  There is no rebound, no guarding and no CVA tenderness  Abdomen is obese  Benign  Well-healed laparoscopic incisions from previous gallbladder surgery   Musculoskeletal: Normal range of motion  Lymphadenopathy:     He has no cervical adenopathy  Neurological: He is alert and oriented to person, place, and time  Skin: Skin is warm and dry  No rash noted  He is not diaphoretic  No erythema  Psychiatric: He has a normal mood and affect  His behavior is normal  Judgment and thought content normal    Nursing note and vitals reviewed  Lab Results: I have personally reviewed pertinent lab results  Imaging: I have personally reviewed pertinent reports  EKG, Pathology, and Other Studies: I have personally reviewed pertinent reports  He is endoscopy revealed gastritis and small hiatal hernia  The biopsies revealed   A  Stomach, antrum biopsy:    -Benign gastric- oxyntoantral type mucosa with minimal superficial chronic inactive gastritis   -No evidence of Paneth cell metaplasia or atrophic gastritis   -No evidence of dysplasia or malignancy   -No H Pylori organisms identified on immunohistochemical stained slide   Control reacted appropriately    Assessment/PLAN:    59 y o  yo male morbidly obese found to be a good candidate to undergo a weight loss operation upon being enrolled here at the [facility]       Patient has a long history of morbid obesity and is presenting to discuss the surgical weight loss options  Despite the patient best efforts patient was unable to lose any meaningful or sustainable weight using nonsurgical means  We had a long discussion regarding all the surgical weight-loss options at our disposal at this point and reviewed the risks and benefits of each procedure in details as it relates to her age, BMI and medical conditions  He has been pre certified to undergo a Laparoscopic Jacki-en-Y gastric bypass possible sleeve gastrectomy  Here today to review his pre op test results  Has been medically cleared for the procedure     ++++++++++++++++++++++++++++  He has obstructive sleep apnea and wears a CPAP machine  ++++++++++++++++++++++++++++    I have discussed with him at length the risks and benefits of the operation and reiterated the components of our multidisciplinary program and the importance of compliance and follow up in the post operative period  Although there is a great statistical chance of improvement or even resolution of most of his associated comorbidities, the results vary from patient to patient and they largely depend on his commitment  The patient was also instructed with regards to the importance of behavior modification, nutritional counseling, support meeting attendance and lifestyle changes that are important to ensure success  He was given the opportunity to ask questions and I have answered all of them  I have addressed with the patient the level of CODE STATUS for this hospital stay and after explaining the different options currently he wishes to be a Level I  He understands and wishes to proceed        Still needs to lose 10 lbs prior to surgery  Will return next week for a weight check and if the weight loss is at least half the way there, the surgery date will not be re scheduled  Patient understands and agrees

## 2018-10-15 NOTE — ASSESSMENT & PLAN NOTE
· Managed by surgery  · Will start clear liquids at 1800 today  · Up walking in hallway  · Pain controlled  · Incentive spirometry at bedside

## 2018-10-16 LAB
ANION GAP SERPL CALCULATED.3IONS-SCNC: 9 MMOL/L (ref 4–13)
BUN SERPL-MCNC: 20 MG/DL (ref 5–25)
CALCIUM SERPL-MCNC: 8.9 MG/DL (ref 8.3–10.1)
CHLORIDE SERPL-SCNC: 101 MMOL/L (ref 100–108)
CO2 SERPL-SCNC: 27 MMOL/L (ref 21–32)
CREAT SERPL-MCNC: 1.15 MG/DL (ref 0.6–1.3)
ERYTHROCYTE [DISTWIDTH] IN BLOOD BY AUTOMATED COUNT: 14.1 % (ref 11.6–15.1)
EST. AVERAGE GLUCOSE BLD GHB EST-MCNC: 137 MG/DL
GFR SERPL CREATININE-BSD FRML MDRD: 69 ML/MIN/1.73SQ M
GLUCOSE SERPL-MCNC: 125 MG/DL (ref 65–140)
HBA1C MFR BLD: 6.4 % (ref 4.2–6.3)
HCT VFR BLD AUTO: 40.2 % (ref 36.5–49.3)
HGB BLD-MCNC: 13.2 G/DL (ref 12–17)
MCH RBC QN AUTO: 31.7 PG (ref 26.8–34.3)
MCHC RBC AUTO-ENTMCNC: 32.8 G/DL (ref 31.4–37.4)
MCV RBC AUTO: 96 FL (ref 82–98)
PLATELET # BLD AUTO: 339 THOUSANDS/UL (ref 149–390)
PMV BLD AUTO: 9.4 FL (ref 8.9–12.7)
POTASSIUM SERPL-SCNC: 4.1 MMOL/L (ref 3.5–5.3)
RBC # BLD AUTO: 4.17 MILLION/UL (ref 3.88–5.62)
SODIUM SERPL-SCNC: 137 MMOL/L (ref 136–145)
WBC # BLD AUTO: 13.28 THOUSAND/UL (ref 4.31–10.16)

## 2018-10-16 PROCEDURE — 85027 COMPLETE CBC AUTOMATED: CPT | Performed by: SURGERY

## 2018-10-16 PROCEDURE — 99024 POSTOP FOLLOW-UP VISIT: CPT | Performed by: SURGERY

## 2018-10-16 PROCEDURE — C9113 INJ PANTOPRAZOLE SODIUM, VIA: HCPCS | Performed by: SURGERY

## 2018-10-16 PROCEDURE — 80048 BASIC METABOLIC PNL TOTAL CA: CPT | Performed by: SURGERY

## 2018-10-16 PROCEDURE — 83036 HEMOGLOBIN GLYCOSYLATED A1C: CPT | Performed by: NURSE PRACTITIONER

## 2018-10-16 PROCEDURE — 99232 SBSQ HOSP IP/OBS MODERATE 35: CPT | Performed by: HOSPITALIST

## 2018-10-16 RX ADMIN — LABETALOL HYDROCHLORIDE 300 MG: 200 TABLET, FILM COATED ORAL at 10:01

## 2018-10-16 RX ADMIN — OXYCODONE HYDROCHLORIDE 10 MG: 5 SOLUTION ORAL at 22:34

## 2018-10-16 RX ADMIN — METRONIDAZOLE 500 MG: 500 INJECTION, SOLUTION INTRAVENOUS at 02:22

## 2018-10-16 RX ADMIN — ACETAMINOPHEN 325 MG: 160 SUSPENSION ORAL at 22:34

## 2018-10-16 RX ADMIN — OXYCODONE HYDROCHLORIDE 5 MG: 5 SOLUTION ORAL at 03:44

## 2018-10-16 RX ADMIN — OXYCODONE HYDROCHLORIDE 10 MG: 5 SOLUTION ORAL at 18:27

## 2018-10-16 RX ADMIN — OXYCODONE HYDROCHLORIDE 10 MG: 5 SOLUTION ORAL at 14:29

## 2018-10-16 RX ADMIN — HYDRALAZINE HYDROCHLORIDE 10 MG: 20 INJECTION INTRAMUSCULAR; INTRAVENOUS at 09:06

## 2018-10-16 RX ADMIN — PANTOPRAZOLE SODIUM 40 MG: 40 INJECTION, POWDER, FOR SOLUTION INTRAVENOUS at 07:27

## 2018-10-16 RX ADMIN — ACETAMINOPHEN 325 MG: 160 SUSPENSION ORAL at 14:29

## 2018-10-16 RX ADMIN — SODIUM CHLORIDE, SODIUM LACTATE, POTASSIUM CHLORIDE, AND CALCIUM CHLORIDE 125 ML/HR: .6; .31; .03; .02 INJECTION, SOLUTION INTRAVENOUS at 03:34

## 2018-10-16 RX ADMIN — CEFAZOLIN SODIUM 2000 MG: 2 SOLUTION INTRAVENOUS at 01:26

## 2018-10-16 RX ADMIN — ACETAMINOPHEN 325 MG: 160 SUSPENSION ORAL at 18:27

## 2018-10-16 RX ADMIN — OXYCODONE HYDROCHLORIDE 10 MG: 5 SOLUTION ORAL at 07:52

## 2018-10-16 RX ADMIN — ACETAMINOPHEN 325 MG: 160 SUSPENSION ORAL at 03:44

## 2018-10-16 RX ADMIN — ACETAMINOPHEN 325 MG: 160 SUSPENSION ORAL at 07:51

## 2018-10-16 RX ADMIN — LOSARTAN POTASSIUM 25 MG: 25 TABLET ORAL at 07:27

## 2018-10-16 RX ADMIN — LABETALOL HYDROCHLORIDE 300 MG: 200 TABLET, FILM COATED ORAL at 21:05

## 2018-10-16 NOTE — ASSESSMENT & PLAN NOTE
· Patient on Losartan 50 mg daily, Labetolol 300 mg BID, and Felodipine 10 mg daily at home  · -190's, heart rate 80- 90's  · Restart Labetolol 300 mg BID as per home regimen  · Recommend patient to be discharged home on single therapy and monitor his blood pressure daily  He has been educated to follow up with PCP regarding consistently elevated or low blood pressures  For consistent SBP >150, would add losartan 25 mg daily  Continue to hold felodipine as this is an extended release medication and cannot be crushed    · Continue to monitor BP

## 2018-10-16 NOTE — PROGRESS NOTES
Bariatric Surgery Progress Note    Subjective  No adverse events overnight  Reports mild nausea and pain   Tolerating PO hydration and ambulation, spirometry  Objective  Vitals:    10/15/18 2300 10/16/18 0709 10/16/18 0902 10/16/18 1001   BP: 166/82 (!) 193/88 164/92 142/60   BP Location:  Right arm Right arm Right arm   Pulse: 91 80  80   Resp: 18 18     Temp: 99 3 °F (37 4 °C) 99 7 °F (37 6 °C)     TempSrc:  Temporal     SpO2: 98% 92%  95%   Weight:       Height:           Exam:  NAD, alert  Normal inspiratory effort  Abdomen soft, non-distended, appropriately tender  Incisions c/d/i  MARI drain serosanguineous  SCDs in place    Labs  Hemoglobin stable    Assessment  66-year-old male POD 1 s/p laparoscopic gastric bypass  He is slightly hypertensive but was started on his oral home medications for blood pressure  Patient looks well clinically  However we will watch him 1 more night due to he has complicated cardiac history and difficult surgery      Plan  - decrease IV fluids  -keep and monitor drain output  - encourage incentive spirometry, ambulation, clear liquids  -PPI for GI prophylaxis  -SCDs for DVT prophylaxis  - not ready for discharge home today  -appreciate Medicine recommendations

## 2018-10-16 NOTE — ASSESSMENT & PLAN NOTE
· CPAP ordered for bedtime  · Patient has his own machine  · Incentive spirometry at bedside and encouraged patient to take home with him and continue to use

## 2018-10-16 NOTE — PROGRESS NOTES
[address] Internal Medicine  Progress Note - Mahi Keane 1960, 59 y o  male MRN: [id number]    Unit/Bed#: E5 -01 Encounter: [id number]    Primary Care Provider: La Damian MD   Date and time admitted to hospital: 10/15/2018  7:16 AM        * S/P gastric bypass   Assessment & Plan    · Managed by surgery  · Tolerating clear liquids  · Up walking in hallway, - flatus / + belching  · Pain controlled  · Incentive spirometry at bedside  · Possible discharge later today by surgery     SALAS on CPAP   Assessment & Plan    · CPAP ordered for bedtime  · Patient has his own machine  · Incentive spirometry at bedside and encouraged patient to take home with him and continue to use  Essential hypertension, benign   Assessment & Plan    · Patient on Losartan 50 mg daily, Labetolol 300 mg BID, and Felodipine 10 mg daily at home  · -190's, heart rate 80- 90's  · Restart Labetolol 300 mg BID as per home regimen  · Recommend patient to be discharged home on single therapy and monitor his blood pressure daily  He has been educated to follow up with PCP regarding consistently elevated or low blood pressures  For consistent SBP >150, would add losartan 25 mg daily  Continue to hold felodipine as this is an extended release medication and cannot be crushed    · Continue to monitor BP     Morbid obesity ([address] )   Assessment & Plan    · BMI 43 7  · S/p Jacki-en-Y gastric bypass surgery     Diabetes mellitus type 2 in obese [facility])   Assessment & Plan    Lab Results   Component Value Date    HGBA1C 5 5 05/02/2018       Recent Labs      10/15/18   1242   POCGLU  163*       Blood Sugar Average: Last 72 hrs:  (P) 163   · Patient denies any history of diabetes  · A1c 6 6 in January 2018, at 5 5 in May 2018  · Fasting glucose this AM at 125  · A1c pending         VTE Pharmacologic Prophylaxis:   Pharmacologic: Pharmacologic VTE Prophylaxis contraindicated due to Bariatric surgery  Mechanical VTE Prophylaxis in Place: Yes    Patient Centered Rounds: I have performed bedside rounds with nursing staff today  Discussions with Specialists or Other Care Team Provider: Bedside RN    Education and Discussions with Family / Patient: plan of care with patient and spouse    Time Spent for Care: 30 minutes  More than 50% of total time spent on counseling and coordination of care as described above  Current Length of Stay: 1 day(s)    Current Patient Status: Inpatient   Certification Statement: The patient will continue to require additional inpatient hospital stay due to to be discharged by surgery later today    Discharge Plan: Home today    Code Status: Level 1 - Full Code      Subjective:   Patient feels well  He has been ambulating the halls  Denies shortness of breath  Reports abdominal discomfort  Denies nausea  Tolerating clear liquids  - flatus / + belching  Objective:     Vitals:   Temp (24hrs), Av 2 °F (36 8 °C), Min:97 2 °F (36 2 °C), Max:99 7 °F (37 6 °C)    Temp:  [97 2 °F (36 2 °C)-99 7 °F (37 6 °C)] 99 7 °F (37 6 °C)  HR:  [] 80  Resp:  [13-18] 18  BP: (125-193)/(60-98) 142/60  SpO2:  [92 %-98 %] 95 %  Body mass index is 43 65 kg/m²  Input and Output Summary (last 24 hours): Intake/Output Summary (Last 24 hours) at 10/16/18 1042  Last data filed at 10/16/18 0739   Gross per 24 hour   Intake             2200 ml   Output             2340 ml   Net             -140 ml       Physical Exam:     Physical Exam   Constitutional: He is oriented to person, place, and time  He appears well-developed and well-nourished  No distress  Eyes: Conjunctivae are normal  No scleral icterus  Cardiovascular: Normal rate, regular rhythm and normal heart sounds  No murmur heard  Pulmonary/Chest: Breath sounds normal  No respiratory distress  He has no wheezes  He has no rales  Abdominal: Bowel sounds are normal  He exhibits distension  There is no tenderness     Musculoskeletal: Normal range of motion  He exhibits no edema or tenderness  Neurological: He is alert and oriented to person, place, and time  Skin: Skin is warm and dry  He is not diaphoretic  Psychiatric: He has a normal mood and affect  His behavior is normal          Additional Data:     Labs:      Results from last 7 days  Lab Units 10/16/18  0443   WBC Thousand/uL 13 28*   HEMOGLOBIN g/dL 13 2   HEMATOCRIT % 40 2   PLATELETS Thousands/uL 339       Results from last 7 days  Lab Units 10/16/18  0443   SODIUM mmol/L 137   POTASSIUM mmol/L 4 1   CHLORIDE mmol/L 101   CO2 mmol/L 27   BUN mg/dL 20   CREATININE mg/dL 1 15   ANION GAP mmol/L 9   CALCIUM mg/dL 8 9           Results from last 7 days  Lab Units 10/15/18  1242   POC GLUCOSE mg/dl 163*                   * I Have Reviewed All Lab Data Listed Above  * Additional Pertinent Lab Tests Reviewed:  [address] Admission Reviewed    Imaging:    Imaging Reports Reviewed Today Include: None  Imaging Personally Reviewed by Myself Includes:  None    Recent Cultures (last 7 days):           Last 24 Hours Medication List:     Current Facility-Administered Medications:  acetaminophen 320 mg Oral Q4H PRN Edilma Oropeza MD    oxyCODONE 5 mg Oral Q4H PRN Edilma Oropeza MD    And        acetaminophen 325 mg Oral Q4H PRN Edilma Oropeza MD    oxyCODONE 10 mg Oral Q4H PRN Edilma Oropeza MD    And        acetaminophen 325 mg Oral Q4H PRN Edilma Oropeza MD    hydrALAZINE 10 mg Intravenous Q6H PRN Chely Kimbrough MD    labetalol 300 mg Oral Q12H [address] Meche OLU Gonzalez    lactated ringers 125 mL/hr Intravenous Continuous Edilma Oropeza MD Last Rate: 125 mL/hr (10/16/18 0334)   metoclopramide 10 mg Intravenous Q6H PRN Chely Kimbrough MD    morphine injection 4 mg Intravenous Q2H PRN Edilma Oropeza MD    morphine injection 2 mg Intravenous Q2H PRN Edilma Oropeza MD    ondansetron 4 mg Intravenous Q4H PRN Edilma Oropeza MD    pantoprazole 40 mg Intravenous Q24H Atilio Sue MD    phenol 2 spray Mouth/Throat Q2H PRN Preeti Hurst MD    promethazine 12 5 mg Intravenous Q6H PRN Bebeto Crenshaw MD         Today, Patient Was Seen By: OLU Brito    ** Please Note: Dictation voice to text software may have been used in the creation of this document   **

## 2018-10-16 NOTE — ASSESSMENT & PLAN NOTE
Lab Results   Component Value Date    HGBA1C 5 5 05/02/2018       Recent Labs      10/15/18   1242   POCGLU  163*       Blood Sugar Average: Last 72 hrs:  (P) 163   · Patient denies any history of diabetes  · A1c 6 6 in January 2018, at 5 5 in May 2018  · Fasting glucose this AM at 125  · A1c pending

## 2018-10-16 NOTE — ASSESSMENT & PLAN NOTE
· Managed by surgery  · Tolerating clear liquids  · Up walking in hallway, - flatus / + belching  · Pain controlled  · Incentive spirometry at bedside  · Possible discharge later today by surgery

## 2018-10-16 NOTE — UTILIZATION REVIEW
Initial Clinical Review    Age/Sex: 59 y o  male    Surgery Date:   10/15/2018    Procedure: Pre-Op Diagnosis Codes: Morbid obesity ([facility] [address] ) [E66 01]  Body mass index is 43 65 kg/m²  Essential hypertension, benign [I10]  SALAS on CPAP [G47 33, Z99 89]     Post-Op Diagnosis Codes:     * Morbid obesity ([facility] [address] ) [E66 01]     * Body mass index is 43 65 kg/m²  * Essential hypertension, benign [I10]     * SALAS on CPAP [G47 33, Z99 89]     Procedure  1  Laparoscopic Jacki-en-Y Gastric Bypass  Intraoperative Endoscopy    Anesthesia:    General    Operative Findings:     Large in prominent intra-abdominal omentum and enlarged left lobe of the liver    Admission Orders: Date/Time/Statement: 10/15/18 @ 1208     Orders Placed This Encounter   Procedures    Inpatient Admission     Standing Status:   Standing     Number of Occurrences:   1     Order Specific Question:   Admitting Physician     Answer:   Jennie Gupta     Order Specific Question:   Level of Care     Answer:   Med Surg [16]     Order Specific Question:   Bed Type     Answer:   Bariatric [1]     Order Specific Question:   Estimated length of stay     Answer:   One Midnight       Vital Signs: /60 (BP Location: Right arm)   Pulse 80   Temp 99 7 °F (37 6 °C) (Temporal)   Resp 18   Ht 5' 11" (1 803 m)   Wt (!) 142 kg (313 lb)   SpO2 95%   BMI 43 65 kg/m²     Diet:        Diet Orders            Start     Ordered    10/15/18 1730  Diet Bariatric; Bariatric Clear Liquid  Diet effective now     Question Answer Comment   Diet Type Bariatric    Bariatric Bariatric Clear Liquid    RD to adjust diet per protocol?  No        10/15/18 1635    10/15/18 1346  Room Service  Once     Question:  Type of Service  Answer:  Room Service-Appropriate    10/15/18 1351          Mobility:   As  livia    DVT Prophylaxis:   SCD'S    Pain Control:   Pain Medications             Ibuprofen (ADVIL) 200 MG CAPS Take 600 mg by mouth as needed        IV  Ancef  Q 8 hrs  Cozaar Daily  Po labetalol   Q 12 hrs  IV  Lopressor  q 6 hrs  IV  Flagyl  Q  8 hrs  IV protonix  Daily  IVF  125/hr  IV  Apresoline  PRN  (  x1  10/16 thus far)    Thank you,  Madhav Irvin Utilization Review Department  Phone: [phone]; Fax [phone]  ATTENTION: Please call with any questions or concerns to [phone]  and carefully follow the prompts so that you are directed to the right person  Send all requests for admission clinical reviews, approved or denied determinations and any other requests to fax [phone]   All voicemails are confidential

## 2018-10-17 VITALS
HEART RATE: 86 BPM | WEIGHT: 313 LBS | OXYGEN SATURATION: 94 % | TEMPERATURE: 98.9 F | DIASTOLIC BLOOD PRESSURE: 77 MMHG | HEIGHT: 71 IN | BODY MASS INDEX: 43.82 KG/M2 | RESPIRATION RATE: 16 BRPM | SYSTOLIC BLOOD PRESSURE: 154 MMHG

## 2018-10-17 LAB
ANION GAP SERPL CALCULATED.3IONS-SCNC: 10 MMOL/L (ref 4–13)
BASOPHILS # BLD AUTO: 0.02 THOUSANDS/ΜL (ref 0–0.1)
BASOPHILS NFR BLD AUTO: 0 % (ref 0–1)
BUN SERPL-MCNC: 21 MG/DL (ref 5–25)
CALCIUM SERPL-MCNC: 8.8 MG/DL (ref 8.3–10.1)
CHLORIDE SERPL-SCNC: 100 MMOL/L (ref 100–108)
CO2 SERPL-SCNC: 25 MMOL/L (ref 21–32)
CREAT SERPL-MCNC: 1.31 MG/DL (ref 0.6–1.3)
EOSINOPHIL # BLD AUTO: 0.08 THOUSAND/ΜL (ref 0–0.61)
EOSINOPHIL NFR BLD AUTO: 1 % (ref 0–6)
ERYTHROCYTE [DISTWIDTH] IN BLOOD BY AUTOMATED COUNT: 14.4 % (ref 11.6–15.1)
GFR SERPL CREATININE-BSD FRML MDRD: 59 ML/MIN/1.73SQ M
GLUCOSE SERPL-MCNC: 101 MG/DL (ref 65–140)
HCT VFR BLD AUTO: 36.1 % (ref 36.5–49.3)
HGB BLD-MCNC: 11.9 G/DL (ref 12–17)
IMM GRANULOCYTES # BLD AUTO: 0.04 THOUSAND/UL (ref 0–0.2)
IMM GRANULOCYTES NFR BLD AUTO: 0 % (ref 0–2)
LYMPHOCYTES # BLD AUTO: 1.56 THOUSANDS/ΜL (ref 0.6–4.47)
LYMPHOCYTES NFR BLD AUTO: 15 % (ref 14–44)
MCH RBC QN AUTO: 31.7 PG (ref 26.8–34.3)
MCHC RBC AUTO-ENTMCNC: 33 G/DL (ref 31.4–37.4)
MCV RBC AUTO: 96 FL (ref 82–98)
MONOCYTES # BLD AUTO: 1.12 THOUSAND/ΜL (ref 0.17–1.22)
MONOCYTES NFR BLD AUTO: 11 % (ref 4–12)
NEUTROPHILS # BLD AUTO: 7.42 THOUSANDS/ΜL (ref 1.85–7.62)
NEUTS SEG NFR BLD AUTO: 73 % (ref 43–75)
NRBC BLD AUTO-RTO: 0 /100 WBCS
PLATELET # BLD AUTO: 328 THOUSANDS/UL (ref 149–390)
PMV BLD AUTO: 9.6 FL (ref 8.9–12.7)
POTASSIUM SERPL-SCNC: 3.3 MMOL/L (ref 3.5–5.3)
RBC # BLD AUTO: 3.75 MILLION/UL (ref 3.88–5.62)
SODIUM SERPL-SCNC: 135 MMOL/L (ref 136–145)
WBC # BLD AUTO: 10.24 THOUSAND/UL (ref 4.31–10.16)

## 2018-10-17 PROCEDURE — 99024 POSTOP FOLLOW-UP VISIT: CPT | Performed by: SURGERY

## 2018-10-17 PROCEDURE — 99232 SBSQ HOSP IP/OBS MODERATE 35: CPT | Performed by: HOSPITALIST

## 2018-10-17 PROCEDURE — C9113 INJ PANTOPRAZOLE SODIUM, VIA: HCPCS | Performed by: SURGERY

## 2018-10-17 PROCEDURE — 85025 COMPLETE CBC W/AUTO DIFF WBC: CPT | Performed by: NURSE PRACTITIONER

## 2018-10-17 PROCEDURE — 80048 BASIC METABOLIC PNL TOTAL CA: CPT | Performed by: NURSE PRACTITIONER

## 2018-10-17 RX ORDER — BISACODYL 10 MG
10 SUPPOSITORY, RECTAL RECTAL ONCE
Status: COMPLETED | OUTPATIENT
Start: 2018-10-17 | End: 2018-10-17

## 2018-10-17 RX ORDER — LOSARTAN POTASSIUM 25 MG/1
25 TABLET ORAL DAILY
Status: DISCONTINUED | OUTPATIENT
Start: 2018-10-17 | End: 2018-10-17 | Stop reason: HOSPADM

## 2018-10-17 RX ORDER — SODIUM CHLORIDE AND POTASSIUM CHLORIDE .9; .15 G/100ML; G/100ML
75 SOLUTION INTRAVENOUS CONTINUOUS
Status: DISCONTINUED | OUTPATIENT
Start: 2018-10-17 | End: 2018-10-17 | Stop reason: HOSPADM

## 2018-10-17 RX ORDER — POTASSIUM CHLORIDE 20MEQ/15ML
20 LIQUID (ML) ORAL ONCE
Status: COMPLETED | OUTPATIENT
Start: 2018-10-17 | End: 2018-10-17

## 2018-10-17 RX ORDER — LOSARTAN POTASSIUM 25 MG/1
25 TABLET ORAL DAILY
Qty: 30 TABLET | Refills: 0 | Status: SHIPPED | OUTPATIENT
Start: 2018-10-18

## 2018-10-17 RX ORDER — LOSARTAN POTASSIUM 25 MG/1
25 TABLET ORAL DAILY
Status: DISCONTINUED | OUTPATIENT
Start: 2018-10-17 | End: 2018-10-17 | Stop reason: SDUPTHER

## 2018-10-17 RX ADMIN — POTASSIUM CHLORIDE 20 MEQ: 20 SOLUTION ORAL at 14:35

## 2018-10-17 RX ADMIN — OXYCODONE HYDROCHLORIDE 10 MG: 5 SOLUTION ORAL at 13:18

## 2018-10-17 RX ADMIN — BISACODYL 10 MG: 10 SUPPOSITORY RECTAL at 09:58

## 2018-10-17 RX ADMIN — OXYCODONE HYDROCHLORIDE 10 MG: 5 SOLUTION ORAL at 08:23

## 2018-10-17 RX ADMIN — LOSARTAN POTASSIUM 25 MG: 25 TABLET ORAL at 11:27

## 2018-10-17 RX ADMIN — LABETALOL HYDROCHLORIDE 300 MG: 200 TABLET, FILM COATED ORAL at 08:22

## 2018-10-17 RX ADMIN — PANTOPRAZOLE SODIUM 40 MG: 40 INJECTION, POWDER, FOR SOLUTION INTRAVENOUS at 08:23

## 2018-10-17 RX ADMIN — SODIUM CHLORIDE, SODIUM LACTATE, POTASSIUM CHLORIDE, AND CALCIUM CHLORIDE 75 ML/HR: .6; .31; .03; .02 INJECTION, SOLUTION INTRAVENOUS at 00:26

## 2018-10-17 NOTE — CONSULTS
[address] Internal Medicine  Consult- [facility] 1960, 59 y o  male MRN: [id number]    Unit/Bed#: E5 -01 Encounter: [id number]    Primary Care Provider: Yadira Antonio MD   Date and time admitted to hospital: 10/15/2018  7:16 AM      Consults Internal Medicine    * S/P gastric bypass   Assessment & Plan    · Managed by surgery  · Tolerating clear liquids  · Up walking in hallway, - flatus / + belching  · Pain controlled  · Incentive spirometry at bedside  · Possible discharge later today by surgery     SALAS on CPAP   Assessment & Plan    · CPAP ordered for bedtime  · Patient has his own machine  · Incentive spirometry at bedside and encouraged patient to take home with him and continue to use  Essential hypertension, benign   Assessment & Plan    · Patient on Losartan 50 mg daily, Labetolol 300 mg BID, and Felodipine 10 mg daily at home  · -190's, heart rate 80- 90's  · On Labetolol 300 mg BID as per home regimen  · Remains hypertensive  · Will add his Losartan at 25 mg daily  · Recommend patient to be discharged home on Labetolol 300 mg BID and Losartan 25 mg daily and monitor his blood pressure daily  He has been educated to follow up with PCP regarding consistently elevated or low blood pressures  For consistent SBP >150, would increase losartan to 50 mg daily  Continue to hold felodipine as this is an extended release medication and cannot be crushed    · Continue to monitor BP     Morbid obesity (HCC)   Assessment & Plan    · BMI 43 7  · S/p Jacki-en-Y gastric bypass surgery     Diabetes mellitus type 2 in obese [facility])   Assessment & Plan    Lab Results   Component Value Date    HGBA1C 6 4 (H) 10/16/2018       Recent Labs      10/15/18   1242   POCGLU  163*       Blood Sugar Average: Last 72 hrs:  (P) 163   · Patient denies any history of diabetes  · A1c 6 6 in January 2018, at 5 5 in May 2018  · A1c 6 4 this admission  · Should be followed as an outpatient VTE Pharmacologic Prophylaxis:   Pharmacologic: Pharmacologic VTE Prophylaxis contraindicated due to bariatric surgery  Mechanical VTE Prophylaxis in Place: Yes    Patient Centered Rounds: I have performed bedside rounds with nursing staff today  Discussions with Specialists or Other Care Team Provider: Bedside RN    Education and Discussions with Family / Patient: plan of care with patient and spouse    Time Spent for Care: 30 minutes  More than 50% of total time spent on counseling and coordination of care as described above  Current Length of Stay: 2 day(s)    Current Patient Status: Inpatient   Certification Statement: The patient will continue to require additional inpatient hospital stay due to as per primary team (surgery)    Discharge Plan: Pending GI motility improvement    Code Status: Level 1 - Full Code      Subjective:   Patient continues to have pain controlled, tolerating clear liquids, +belching / -flatus  Feels distended / bloated  Denies nausea  Denies shortness of breath  No other complaints  Objective:     Vitals:   Temp (24hrs), Av 9 °F (37 2 °C), Min:98 1 °F (36 7 °C), Max:100 4 °F (38 °C)    Temp:  [98 1 °F (36 7 °C)-100 4 °F (38 °C)] 98 9 °F (37 2 °C)  HR:  [81-88] 86  Resp:  [16-20] 16  BP: (154-164)/() 154/77  SpO2:  [93 %-94 %] 94 %  Body mass index is 43 65 kg/m²  Input and Output Summary (last 24 hours): Intake/Output Summary (Last 24 hours) at 10/17/18 1254  Last data filed at 10/17/18 1001   Gross per 24 hour   Intake          4269 58 ml   Output              740 ml   Net          3529 58 ml       Physical Exam:     Physical Exam   Constitutional: He is oriented to person, place, and time  He appears well-developed and well-nourished  No distress  Eyes: Conjunctivae are normal  No scleral icterus  Cardiovascular: Normal rate, regular rhythm and normal heart sounds  No murmur heard    Pulmonary/Chest: Effort normal and breath sounds normal  No respiratory distress  He has no wheezes  He has no rales  Abdominal: Bowel sounds are normal  He exhibits distension  There is tenderness (Mild tenderness, diffuse)  Musculoskeletal: Normal range of motion  He exhibits no edema or tenderness  Neurological: He is alert and oriented to person, place, and time  Skin: Skin is warm and dry  He is not diaphoretic  Psychiatric: He has a normal mood and affect  His behavior is normal        Additional Data:     Labs:      Results from last 7 days  Lab Units 10/17/18  0615   WBC Thousand/uL 10 24*   HEMOGLOBIN g/dL 11 9*   HEMATOCRIT % 36 1*   PLATELETS Thousands/uL 328   NEUTROS PCT % 73   LYMPHS PCT % 15   MONOS PCT % 11   EOS PCT % 1       Results from last 7 days  Lab Units 10/17/18  0615   SODIUM mmol/L 135*   POTASSIUM mmol/L 3 3*   CHLORIDE mmol/L 100   CO2 mmol/L 25   BUN mg/dL 21   CREATININE mg/dL 1 31*   ANION GAP mmol/L 10   CALCIUM mg/dL 8 8           Results from last 7 days  Lab Units 10/15/18  1242   POC GLUCOSE mg/dl 163*       Results from last 7 days  Lab Units 10/16/18  0443   HEMOGLOBIN A1C % 6 4*               * I Have Reviewed All Lab Data Listed Above  * Additional Pertinent Lab Tests Reviewed:  [address] Admission Reviewed    Imaging:    Imaging Reports Reviewed Today Include: None  Imaging Personally Reviewed by Myself Includes:  None    Recent Cultures (last 7 days):           Last 24 Hours Medication List:     Current Facility-Administered Medications:  acetaminophen 320 mg Oral Q4H PRN Jack Teixeira MD    oxyCODONE 5 mg Oral Q4H PRN Jack Teixeira MD    And        acetaminophen 325 mg Oral Q4H PRN Jack Teixeira MD    oxyCODONE 10 mg Oral Q4H PRN Jack Teixeira MD    And        acetaminophen 325 mg Oral Q4H PRN Jack Teixeira MD    hydrALAZINE 10 mg Intravenous Q6H PRN Treasure Slade MD    labetalol 300 mg Oral Q12H [address] [facility], CRNP    lactated ringers 75 mL/hr Intravenous Continuous LOU Ashley Last Rate: 75 mL/hr (10/17/18 0026)   losartan 25 mg Oral Daily Josefa Alvarado MD    metoclopramide 10 mg Intravenous Q6H PRN Miya Patient, MD    morphine injection 4 mg Intravenous Q2H PRN Lugenia Failing, MD    morphine injection 2 mg Intravenous Q2H PRN Lugenia Failing, MD    ondansetron 4 mg Intravenous Q4H PRN Lugenia Failing, MD    pantoprazole 40 mg Intravenous Q24H [place] [address], MD    phenol 2 spray Mouth/Throat Q2H PRN Lugenia Failing, MD    promethazine 12 5 mg Intravenous Q6H PRN Miya Hernandez, MD         Today, Patient Was Seen By: OLU Ashley    ** Please Note: Dictation voice to text software may have been used in the creation of this document   **

## 2018-10-17 NOTE — DISCHARGE SUMMARY
Discharge Summary - Colin Smith 59 y o  male MRN: [id number]    Unit/Bed#: E5 -01 Encounter: [id number]    Admission Date:   Admission Orders     Ordered        10/15/18 1208  Inpatient Admission  Once               Admitting Diagnosis: Morbid obesity ([facility] [address] ) [E66 01]  Essential hypertension, benign [I10]  SALAS on CPAP [G47 33, Z99 89]    HPI:  58-year-old male admitted electively for laparoscopic gastric bypass    Procedures Performed:   Laparoscopic gastric bypass  Intraoperative EGD    Summary of Hospital Course:  Patient tolerated surgery well without complications  His surgery was technically challenging so a surgical drain was left in place  In the morning of postoperative day 1, the patient had mild nausea and mild to moderate abdominal pain  His labs and vitals were stable and his surgical drain output was serosanguineous in nature  The patient was slightly hypertensive so he was started on some of his blood pressure medications with improvement in his blood pressure  During the day the patient increase his ambulation and was tolerating more clear liquids  In the morning of postoperative day 2, the patient had mild nausea but was tolerating clears, ambulating and voiding independently  The patient's blood pressure was well controlled and he was deemed ready for discharge home  Significant Findings, Care, Treatment and Services Provided: pain meds, antiemetics  Complications: none    Discharge Diagnosis: morbid obesity, status post gastric surgery    Resolved Problems  Date Reviewed: 10/17/2018    None          Condition at Discharge: good         Discharge instructions/Information to patient and family:   See after visit summary for information provided to patient and family  Provisions for Follow-Up Care:  See after visit summary for information related to follow-up care and any pertinent home health orders        PCP: Carmella Willett MD    Disposition: Home    Planned Readmission: No    Discharge Statement   I spent 20 minutes discharging the patient  This time was spent on the day of discharge  I had direct contact with the patient on the day of discharge  Additional documentation is required if more than 30 minutes were spent on discharge  Discharge Medications:  See after visit summary for reconciled discharge medications provided to patient and family

## 2018-10-17 NOTE — PROGRESS NOTES
Bariatric Surgery Progress Note    Subjective  Nothing overnight  Patient did not sleep well tolerating diet, having some belching, no flatulence  + Ambulating  Objective  Vitals:    10/16/18 1900 10/16/18 2057 10/16/18 2300 10/17/18 0725   BP: 164/78 159/86 158/82 (!) 156/114   BP Location: Right arm Right arm Right arm    Pulse: 81 85 81 86   Resp: 20 18 16   Temp: 98 4 °F (36 9 °C)  98 1 °F (36 7 °C) 98 9 °F (37 2 °C)   TempSrc: Temporal  Temporal Temporal   SpO2: 94%  93% 94%   Weight:       Height:           Exam:  Gen: NAD, A&O, Comfortable   Chest: Normal work of breathing, no resp distress  Abd: S, ND, NT, incisions cdi, drain 100 ml serosang  Ext: No edema, SCDs on  Skin: warm, dry, intact        Labs  Recent Labs      10/16/18   0443  10/17/18   0615   WBC  13 28*  10 24*   HGB  13 2  11 9*   PLT  339  328     Recent Labs      10/16/18   0443  10/17/18   0615   NA  137  135*   K  4 1  3 3*   CL  101  100   CO2  27  25   BUN  20  21   CREATININE  1 15  1 31*   CALCIUM  8 9  8 8               Assessment  59year-old male 2 Days Post-Op s/p laparoscopic gastric bypass  He is slightly hypertensive but was started on his oral home medications for blood pressure  Patient looks well clinically    Possibly DC later today    Plan  -keep and monitor drain output  - encourage incentive spirometry, ambulation, clear liquids  -PPI for GI prophylaxis  -SCDs for DVT prophylaxis  -appreciate Medicine recommendations

## 2018-10-17 NOTE — DISCHARGE INSTRUCTIONS

## 2018-10-17 NOTE — ASSESSMENT & PLAN NOTE
· Patient on Losartan 50 mg daily, Labetolol 300 mg BID, and Felodipine 10 mg daily at home  · -190's, heart rate 80- 90's  · On Labetolol 300 mg BID as per home regimen  · Remains hypertensive  · Will add his Losartan at 25 mg daily  · Recommend patient to be discharged home on Labetolol 300 mg BID and Losartan 25 mg daily and monitor his blood pressure daily  He has been educated to follow up with PCP regarding consistently elevated or low blood pressures  For consistent SBP >150, would increase losartan to 50 mg daily  Continue to hold felodipine as this is an extended release medication and cannot be crushed    · Continue to monitor BP

## 2018-10-17 NOTE — ASSESSMENT & PLAN NOTE
Lab Results   Component Value Date    HGBA1C 6 4 (H) 10/16/2018       Recent Labs      10/15/18   1242   POCGLU  163*       Blood Sugar Average: Last 72 hrs:  (P) 163   · Patient denies any history of diabetes  · A1c 6 6 in January 2018, at 5 5 in May 2018  · A1c 6 4 this admission  · Should be followed as an outpatient

## 2018-10-17 NOTE — PROGRESS NOTES
[address] Internal Medicine  Consult- Patrick Marlon 1960, 59 y o  male MRN: [id number]     Unit/Bed#: E5 -01 Encounter: [id number]     Primary Care Provider: Dulce Maria Constantino MD   Date and time admitted to hospital: 10/15/2018  7:16 AM        Consults Internal Medicine         * S/P Jacki-en-Y bypass POD#1   Assessment & Plan     · Has some nausea  · Tolerating clear liquids  · Up walking in hallway, - flatus / + belching  · Pain controlled  · Incentive spirometry at bedside  ·       SALAS on CPAP   Assessment & Plan     · CPAP ordered for bedtime  · Patient has his own machine  · Incentive spirometry at bedside and encouraged patient to take home with him and continue to use       Essential hypertension, benign   Assessment & Plan     · Patient on Losartan 50 mg daily, Labetolol 300 mg BID, and Felodipine 10 mg daily at home  · -190's, heart rate 80- 90's  · On Labetolol 300 mg BID as per home regimen  · Remains hypertensive  · Will add his Losartan at 25 mg daily  · Recommend patient to be discharged home on Labetolol 300 mg BID and Losartan 25 mg daily and monitor his blood pressure daily  He has been educated to follow up with PCP regarding consistently elevated or low blood pressures  For consistent SBP >150, would increase losartan to 50 mg daily  Continue to hold felodipine as this is an extended release medication and cannot be crushed    · Continue to monitor BP      Morbid obesity ([facility] [address] )   Assessment & Plan     · BMI 43 7  · S/p Jacki-en-Y gastric bypass surgery      Diabetes mellitus type 2 in obese [facility])   Assessment & Plan             Lab Results   Component Value Date     HGBA1C 6 4 (H) 10/16/2018             Recent Labs      10/15/18   1242   POCGLU  163*         Blood Sugar Average: Last 72 hrs:  (P) 163   · Patient denies any history of diabetes  · A1c 6 6 in January 2018, at 5 5 in May 2018  · A1c 6 4 this admission  · Should be followed as an outpatient             VTE Pharmacologic Prophylaxis:   Pharmacologic: Pharmacologic VTE Prophylaxis contraindicated due to bariatric surgery  Mechanical VTE Prophylaxis in Place: Yes     Patient Centered Rounds: I have performed bedside rounds with nursing staff today      Discussions with Specialists or Other Care Team Provider: Bedside RN     Education and Discussions with Family / Patient: plan of care with patient and spouse     Time Spent for Care: 30 minutes  More than 50% of total time spent on counseling and coordination of care as described above      Current Length of Stay: 2 day(s)     Current Patient Status: Inpatient   Certification Statement: The patient will continue to require additional inpatient hospital stay due to as per primary team (surgery)     Discharge Plan: Pending GI motility improvement     Code Status: Level 1 - Full Code        Subjective:   Patient continues to have pain controlled, tolerating clear liquids, +belching / -flatus  Feels distended / bloated  Denies nausea  Denies shortness of breath  No other complaints      Objective:      Vitals:   Temp (24hrs), Av 9 °F (37 2 °C), Min:98 1 °F (36 7 °C), Max:100 4 °F (38 °C)     Temp:  [98 1 °F (36 7 °C)-100 4 °F (38 °C)] 98 9 °F (37 2 °C)  HR:  [81-88] 86  Resp:  [16-20] 16  BP: (154-164)/() 154/77  SpO2:  [93 %-94 %] 94 %  Body mass index is 43 65 kg/m²       Input and Output Summary (last 24 hours):         Intake/Output Summary (Last 24 hours) at 10/17/18 1254  Last data filed at 10/17/18 1001    Gross per 24 hour   Intake          4269 58 ml   Output              740 ml   Net          3529 58 ml         Physical Exam:      Physical Exam   Constitutional: He is oriented to person, place, and time  He appears well-developed and well-nourished  No distress  Eyes: Conjunctivae are normal  No scleral icterus  Cardiovascular: Normal rate, regular rhythm and normal heart sounds  No murmur heard    Pulmonary/Chest: Effort normal and breath sounds normal  No respiratory distress  He has no wheezes  He has no rales  Abdominal: Bowel sounds are normal  He exhibits distension  There is tenderness (Mild tenderness, diffuse)  Musculoskeletal: Normal range of motion  He exhibits no edema or tenderness  Neurological: He is alert and oriented to person, place, and time  Skin: Skin is warm and dry  He is not diaphoretic  Psychiatric: He has a normal mood and affect  His behavior is normal          Additional Data:      Labs:        Results from last 7 days  Lab Units 10/17/18  0615   WBC Thousand/uL 10 24*   HEMOGLOBIN g/dL 11 9*   HEMATOCRIT % 36 1*   PLATELETS Thousands/uL 328   NEUTROS PCT % 73   LYMPHS PCT % 15   MONOS PCT % 11   EOS PCT % 1         Results from last 7 days  Lab Units 10/17/18  0615   SODIUM mmol/L 135*   POTASSIUM mmol/L 3 3*   CHLORIDE mmol/L 100   CO2 mmol/L 25   BUN mg/dL 21   CREATININE mg/dL 1 31*   ANION GAP mmol/L 10   CALCIUM mg/dL 8 8             Results from last 7 days  Lab Units 10/15/18  1242   POC GLUCOSE mg/dl 163*         Results from last 7 days  Lab Units 10/16/18  0443   HEMOGLOBIN A1C % 6 4*                   * I Have Reviewed All Lab Data Listed Above  * Additional Pertinent Lab Tests Reviewed:  [address] Admission Reviewed     Imaging:     Imaging Reports Reviewed Today Include: None  Imaging Personally Reviewed by Myself Includes:  None     Recent Cultures (last 7 days):             Last 24 Hours Medication List:      Current Facility-Administered Medications:  acetaminophen 320 mg Oral Q4H PRN Damián Cannon MD     oxyCODONE 5 mg Oral Q4H PRN Damián Cannon MD     And             acetaminophen 325 mg Oral Q4H PRN Damián Cannon MD     oxyCODONE 10 mg Oral Q4H PRN Damián Cannon MD     And             acetaminophen 325 mg Oral Q4H PRN Damián Cannon MD     hydrALAZINE 10 mg Intravenous Q6H PRN Donna Love MD     labetalol 300 mg Oral Q12H [facility] HOME Emmie Patel OLU     lactated ringers 75 mL/hr Intravenous Continuous OLU Welch Last Rate: 75 mL/hr (10/17/18 0026)   losartan 25 mg Oral Daily Anju Haq MD     metoclopramide 10 mg Intravenous Q6H PRN Gladys Amador MD     morphine injection 4 mg Intravenous Q2H PRN Cami Ryees MD     morphine injection 2 mg Intravenous Q2H PRN Cami Reyes MD     ondansetron 4 mg Intravenous Q4H PRN Cami Reyes MD     pantoprazole 40 mg Intravenous Q24H Paraguchelle Sue MD     phenol 2 spray Mouth/Throat Q2H PRN Cami Reyes MD     promethazine 12 5 mg Intravenous Q6H PRN Gladys Amador MD           Today, Patient Was Seen By: OLU Welch     ** Please Note: Dictation voice to text software may have been used in the creation of this document   **

## 2018-10-18 ENCOUNTER — TELEPHONE (OUTPATIENT)
Dept: BARIATRICS | Facility: CLINIC | Age: 58
End: 2018-10-18

## 2018-10-25 ENCOUNTER — OFFICE VISIT (OUTPATIENT)
Dept: BARIATRICS | Facility: CLINIC | Age: 58
End: 2018-10-25

## 2018-10-25 VITALS
HEART RATE: 76 BPM | WEIGHT: 306.5 LBS | HEIGHT: 71 IN | BODY MASS INDEX: 42.91 KG/M2 | TEMPERATURE: 97.6 F | DIASTOLIC BLOOD PRESSURE: 84 MMHG | SYSTOLIC BLOOD PRESSURE: 138 MMHG

## 2018-10-25 DIAGNOSIS — E66.01 MORBID OBESITY (HCC): Primary | ICD-10-CM

## 2018-10-25 DIAGNOSIS — Z98.84 BARIATRIC SURGERY STATUS: Primary | ICD-10-CM

## 2018-10-25 PROCEDURE — 99024 POSTOP FOLLOW-UP VISIT: CPT | Performed by: SURGERY

## 2018-10-25 PROCEDURE — RECHECK: Performed by: DIETITIAN, REGISTERED

## 2018-10-25 RX ORDER — LABETALOL 200 MG/1
TABLET, FILM COATED ORAL
COMMUNITY
Start: 2018-10-23

## 2018-10-25 NOTE — PROGRESS NOTES
POST OP UP VISIT - BARIATRIC SURGERY  Joe Best 59 y o  male MRN: [id number]  Unit/Bed#:  Encounter: [id number]      HPI:  Joe Best is a 59 y o  male status post laparoscopic Priscilla-en-Y gastric bypass  Here today for first postop appointment  Review of Systems    Historical Information   Past Medical History:   Diagnosis Date    Benign hypertension     Calculus of gallbladder without cholecystitis without obstruction     CPAP (continuous positive airway pressure) dependence     GERD (gastroesophageal reflux disease)     H/O nonmelanoma skin cancer     Lumbar disc disease     Obesity     Postgastrectomy malabsorption     Pre-operative laboratory examination     Seasonal allergies     Sleep apnea     cpap    Wears glasses      Past Surgical History:   Procedure Laterality Date    BACK SURGERY      lumbar fusion    CARDIAC CATHETERIZATION      CHOLECYSTECTOMY      COLONOSCOPY      LA EGD TRANSORAL BIOPSY SINGLE/MULTIPLE N/A 5/18/2018    Procedure: ESOPHAGOGASTRODUODENOSCOPY (EGD); Surgeon: Jeff Garcia MD;  Location: [facility];   Service: General    LA LAP GASTRIC BYPASS/PRISCILLA-EN-Y N/A 10/15/2018    Procedure: LAPAROSCOPIC PRISCILLA-EN-Y GASTRIC BYPASS AND INTRAOPERATIVE EGD;  Surgeon: Rock Bowman MD;  Location: [facility];  Service: Bariatrics     Social History   History   Alcohol Use No     History   Drug Use No     History   Smoking Status    Never Smoker   Smokeless Tobacco    Never Used     Family History: non-contributory    Meds/Allergies   all medications and allergies reviewed  No Known Allergies    Objective       Current Vitals:   Blood Pressure: 138/84 (10/25/18 1015)  Pulse: 76 (10/25/18 1015)  Temperature: 97 6 °F (36 4 °C) (10/25/18 1015)  Temp Source: Tympanic (10/25/18 1015)  Height: 5' 11" (180 3 cm) (10/25/18 1015)  Weight - Scale: (!) 139 kg (306 lb 8 oz) (10/25/18 1015)    [unfilled]    Invasive Devices     Drain            Closed/Suction Drain Midline Abdomen Bulb 19 Fr  9 days                Physical Exam   Constitutional: He is oriented to person, place, and time  He appears well-developed  No distress  Abdominal: Soft  Bowel sounds are normal  He exhibits no distension and no mass  There is no tenderness  There is no rebound and no guarding  Abdomen is obese  Benign to examination  Incisions are healing well with no evidence of infection  Neurological: He is alert and oriented to person, place, and time  Psychiatric: He has a normal mood and affect  His behavior is normal  Judgment and thought content normal    Vitals reviewed  Assessment/PLAN:    59 y o  male status post laparoscopic Jacki-en-Y gastric bypass on 10/15/2018  Doing well post op  No major issues  Aleksey drain with minimal serosanguineous drainage  Drain removed today  Increase physical activity as tolerated and as instructed  Advance diet as instructed by our dietitians today and as indicated in the binder  Follow up in one month as scheduled                Yasmeen Barros MD  10/25/2018  10:21 AM

## 2018-10-25 NOTE — PROGRESS NOTES
Weight Management Nutrition Class     Diagnosis: Obesity    Bariatric Surgeon: Dr Yasmeen Barros    Surgery: Gastric Bypass Laparoscopic    Class: first post op note    Topics discussed today include:     fluid goals post op, protein goals post op, constipation, chew food well, exercise, diet progression, protein supplems, vitamin/mineral supplements and calcium supplements    Patient was able to verbalize basic diet (protein, fluid, vitamin and mineral) recommendations and possible nutrition-related complications   Yes

## 2018-11-30 ENCOUNTER — TELEPHONE (OUTPATIENT)
Dept: BARIATRICS | Facility: CLINIC | Age: 58
End: 2018-11-30

## 2018-12-04 ENCOUNTER — CLINICAL SUPPORT (OUTPATIENT)
Dept: BARIATRICS | Facility: CLINIC | Age: 58
End: 2018-12-04

## 2018-12-04 DIAGNOSIS — Z98.84 BARIATRIC SURGERY STATUS: Primary | ICD-10-CM

## 2018-12-04 PROCEDURE — RECHECK: Performed by: DIETITIAN, REGISTERED

## 2018-12-04 NOTE — PROGRESS NOTES
Weight Management Nutrition Class     Diagnosis: Obesity    Bariatric Surgeon: Dr Ilia Irving    Surgery: Gastric Bypass Laparoscopic    Class: 5 week post op     Topics discussed today include:     fluid goals post op, protein goals post op, constipation, chew food well, exercise, avoidance of alcohol, PPI use, diet progression, protein supplems, vitamin/mineral supplements and calcium supplements    Patient was able to verbalize basic diet (protein, fluid, vitamin and mineral) recommendations and possible nutrition-related complications   Yes

## 2019-01-21 RX ORDER — FLUTICASONE PROPIONATE 50 MCG
2 SPRAY, SUSPENSION (ML) NASAL
COMMUNITY
Start: 2016-10-03

## 2019-01-21 RX ORDER — LOSARTAN POTASSIUM 50 MG/1
50 TABLET ORAL
COMMUNITY
Start: 2018-11-26 | End: 2019-04-12 | Stop reason: HOSPADM

## 2019-01-22 ENCOUNTER — OFFICE VISIT (OUTPATIENT)
Dept: BARIATRICS | Facility: CLINIC | Age: 59
End: 2019-01-22
Payer: COMMERCIAL

## 2019-01-22 VITALS
TEMPERATURE: 99.1 F | HEIGHT: 71 IN | SYSTOLIC BLOOD PRESSURE: 140 MMHG | BODY MASS INDEX: 35.67 KG/M2 | HEART RATE: 89 BPM | DIASTOLIC BLOOD PRESSURE: 94 MMHG | WEIGHT: 254.8 LBS

## 2019-01-22 DIAGNOSIS — G47.33 OSA ON CPAP: ICD-10-CM

## 2019-01-22 DIAGNOSIS — E11.69 DIABETES MELLITUS TYPE 2 IN OBESE (HCC): ICD-10-CM

## 2019-01-22 DIAGNOSIS — Z99.89 OSA ON CPAP: ICD-10-CM

## 2019-01-22 DIAGNOSIS — K91.2 POSTSURGICAL MALABSORPTION: ICD-10-CM

## 2019-01-22 DIAGNOSIS — K21.9 GASTROESOPHAGEAL REFLUX DISEASE, ESOPHAGITIS PRESENCE NOT SPECIFIED: ICD-10-CM

## 2019-01-22 DIAGNOSIS — I10 ESSENTIAL HYPERTENSION, BENIGN: ICD-10-CM

## 2019-01-22 DIAGNOSIS — Z98.84 S/P GASTRIC BYPASS: Primary | ICD-10-CM

## 2019-01-22 DIAGNOSIS — E66.9 DIABETES MELLITUS TYPE 2 IN OBESE (HCC): ICD-10-CM

## 2019-01-22 PROCEDURE — 99214 OFFICE O/P EST MOD 30 MIN: CPT | Performed by: PHYSICIAN ASSISTANT

## 2019-01-22 RX ORDER — DEXTROMETHORPHAN HYDROBROMIDE AND PROMETHAZINE HYDROCHLORIDE 15; 6.25 MG/5ML; MG/5ML
SYRUP ORAL
COMMUNITY

## 2019-01-22 NOTE — PATIENT INSTRUCTIONS
GOALS:   · Continued/Maintain healthy weight loss with good nutrition intakes  · Adequate hydration with at least 64oz  fluid intake - avoid iced cold water - may cause spasm   · Normal vitamin and mineral levels  · Exercise as tolerated  · Try mixing protein powder, cottage cheese, or ricotta into oatmeal for more protein  · Make sure yogurt is around 13g sugar or less - mix low fat plain with a fruit flavor to decrease yogurt    · Follow-up in 3 months  We kindly ask that your arrive 15 minutes before your scheduled appointment time with your provider to allow our staff to room you, get your vital signs and update your chart  · Follow diet as discussed  · Get lab work done in the next 2 weeks  You have been given a lab slip today  Please call the office if you need a replacement  It is recommended to check with your insurance BEFORE getting labs done to make sure they are covered by your policy  Also, please check with your PCP and other providers before getting labs to avoid duplicate labs  Make sure to HOLD any multivitamins that may contain biotin and any biotin supplements FOR 5 DAYS before any labs since it can affect the results  · Follow vitamin and mineral recommendations as reviewed with you  · Call our office if you have any problems with abdominal pain especially associated with fever, chills, nausea, vomiting or any other concerns  · All  Post-bariatric surgery patients should be aware that very small quantities of any alcohol can cause impairment and it is very possible not to feel the effect  The effect can be in the system for several hours  It is also a stomach irritant  · It is advised to AVOID alcohol, Nonsteroidal antiinflammatory drugs (NSAIDS) and nicotine of all forms   Any of these can cause stomach irritation/pain

## 2019-01-22 NOTE — ASSESSMENT & PLAN NOTE
Lab Results   Component Value Date    HGBA1C 6 4 (H) 10/16/2018     -Patient reports never taking any DM medications  -Will repeat HgbA1C, likely improved s/p surgery

## 2019-01-22 NOTE — ASSESSMENT & PLAN NOTE
-At risk for malabsorption of vitamins/minerals secondary to malabsorption and restriction of intake from bariatric surgery  -Currently taking adequate postop bariatric surgery vitamin supplementation: Bariatric Advantage, calcium citrate  -Next set of bariatric labs ordered for approximately 2 weeks  -Patient received education about the importance of adhering to a lifelong supplementation regimen to avoid vitamin/mineral deficiencies

## 2019-01-22 NOTE — ASSESSMENT & PLAN NOTE
-s/p Jacki-En-Y Gastric Bypass with Dr Phill Kong on 10/15/18  EWL is 50%, which places the patient on schedule for expected post surgical weight loss at this time  · Tolerating a regular diet-yes  · Eating at least 60 grams of protein per day-yes  · Following 30/60 minute rule with liquids-yes  · Drinking at least 64 ounces of fluid per day-no, he agrees to increase  · Drinking carbonated beverages-no  · Sufficient exercise-yes - walks 2 miles daily - he agrees to increase  · Using NSAIDs regularly-no  · Using nicotine-no  · Using alcohol-yes, drinks about 2-3 glasses on Friday and Saturday  Explained the risks of ETOH s/p bariatric surgery and advised him to eliminate ETOH  He verbalizes understanding

## 2019-01-22 NOTE — ASSESSMENT & PLAN NOTE
-Encouraged consistent use of CPAP and follow up with sleep medicine for mask refitting/adjustments   -Discussed risks of untreated sleep apnea such as sudden cardiac death by arrhythmia, uncontrolled hypertension, difficulty with weight loss, decreased quality sleep, increased insulin resistance, and stroke    -Should improve with dietary and lifestyle changes

## 2019-01-22 NOTE — PROGRESS NOTES
Assessment/Plan:    S/P gastric bypass  -s/p Jacki-En-Y Gastric Bypass with Dr Kaur Sarmiento on 10/15/18  EWL is 50%, which places the patient on schedule for expected post surgical weight loss at this time  · Tolerating a regular diet-yes  · Eating at least 60 grams of protein per day-yes  · Following 30/60 minute rule with liquids-yes  · Drinking at least 64 ounces of fluid per day-no, he agrees to increase  · Drinking carbonated beverages-no  · Sufficient exercise-yes - walks 2 miles daily - he agrees to increase  · Using NSAIDs regularly-no  · Using nicotine-no  · Using alcohol-yes, drinks about 2-3 glasses on Friday and Saturday  Explained the risks of ETOH s/p bariatric surgery and advised him to eliminate ETOH  He verbalizes understanding  Diabetes mellitus type 2 in obese [facility])  Lab Results   Component Value Date    HGBA1C 6 4 (H) 10/16/2018     -Patient reports never taking any DM medications  -Will repeat HgbA1C, likely improved s/p surgery      SALAS on CPAP  -Encouraged consistent use of CPAP and follow up with sleep medicine for mask refitting/adjustments   -Discussed risks of untreated sleep apnea such as sudden cardiac death by arrhythmia, uncontrolled hypertension, difficulty with weight loss, decreased quality sleep, increased insulin resistance, and stroke    -Should improve with dietary and lifestyle changes    Essential hypertension, benign  -BP elevated today  -On labetalol and losartan  -Continue with monitoring and management with PCP    GERD (gastroesophageal reflux disease)  -Reflux resolved  -Continue with PPI until next visit (will taper off at 6 months s/p surgery)    Postsurgical malabsorption  -At risk for malabsorption of vitamins/minerals secondary to malabsorption and restriction of intake from bariatric surgery  -Currently taking adequate postop bariatric surgery vitamin supplementation: Bariatric Advantage, calcium citrate  -Next set of bariatric labs ordered for approximately 2 weeks  -Patient received education about the importance of adhering to a lifelong supplementation regimen to avoid vitamin/mineral deficiencies        Diagnoses and all orders for this visit:    S/P gastric bypass  -     CBC and differential; Future  -     Comprehensive metabolic panel; Future  -     Copper Level; Future  -     Ferritin; Future  -     Folate; Future  -     Iron; Future  -     Iron Saturation %; Future  -     PTH, intact; Future  -     Vitamin A; Future  -     Vitamin B1, whole blood; Future  -     Vitamin B12; Future  -     Vitamin D 25 hydroxy; Future  -     Zinc; Future  -     Hemoglobin A1C; Future  -     Lipid panel; Future    Diabetes mellitus type 2 in obese (HCC)    SALAS on CPAP    Essential hypertension, benign    Gastroesophageal reflux disease, esophagitis presence not specified    Postsurgical malabsorption  -     CBC and differential; Future  -     Comprehensive metabolic panel; Future  -     Copper Level; Future  -     Ferritin; Future  -     Folate; Future  -     Iron; Future  -     Iron Saturation %; Future  -     PTH, intact; Future  -     Vitamin A; Future  -     Vitamin B1, whole blood; Future  -     Vitamin B12; Future  -     Vitamin D 25 hydroxy; Future  -     Zinc; Future  -     Hemoglobin A1C; Future  -     Lipid panel; Future    Other orders  -     losartan (COZAAR) 50 mg tablet; Take 50 mg by mouth  -     fluticasone (FLONASE) 50 mcg/act nasal spray; 2 puffs into each nostril  -     promethazine-dextromethorphan (PHENERGAN-DM) 6 25-15 mg/5 mL oral syrup; promethazine-DM 6 25 mg-15 mg/5 mL oral syrup          Subjective:      Patient ID: Estevan Mac is a 62 y o  male  -s/p Jacki-En-Y Gastric Bypass with Dr Rayfield Boeck on 10/15/18  Presents to the office today for routine follow up  Tolerating diet without issues; denies N/V, dysphagia, reflux  Overall doing Well      Initial: 334 5lbs  Current: 254 8lbs  EWL: 50% (Weight loss is on schedule at this post surgical period )  Alexi: current  Current BMI is Body mass index is 35 54 kg/m²  Diet Recall:   B - 1-2 cups coffee with milk   then later oatmeal with honey  L - skips or yogurt and fruit or sandwich  D - chicken and vegetable  Snacks - rarely Halo ice cream    Fluids: water, Friday and Saturday - 3 glasses, 32oz  water     The following portions of the patient's history were reviewed and updated as appropriate: allergies, current medications, past family history, past medical history, past social history, past surgical history and problem list     Review of Systems   Constitutional: Negative for chills and fever  Unexpected weight change: planned weight loss  HENT: Negative for trouble swallowing  Respiratory: Negative for cough and shortness of breath  Cardiovascular: Negative for chest pain and palpitations  Gastrointestinal: Negative for abdominal pain, constipation, diarrhea, nausea and vomiting  Neurological: Negative for dizziness  Psychiatric/Behavioral:        Denies anxiety and depression         Objective:      /94 (BP Location: Right arm, Patient Position: Sitting, Cuff Size: Large)   Pulse 89   Temp 99 1 °F (37 3 °C) (Tympanic)   Ht 5' 11" (1 803 m)   Wt 116 kg (254 lb 12 8 oz)   BMI 35 54 kg/m²          Physical Exam   Constitutional: He is oriented to person, place, and time  He appears well-developed and well-nourished  No distress  HENT:   Head: Normocephalic and atraumatic  Eyes: Pupils are equal, round, and reactive to light  No scleral icterus  Cardiovascular: Normal rate, regular rhythm and normal heart sounds  Pulmonary/Chest: Effort normal and breath sounds normal  No respiratory distress  Abdominal: Soft  Bowel sounds are normal  He exhibits no distension  There is no tenderness  No incisional hernias appreciated   Musculoskeletal: He exhibits no edema  Neurological: He is alert and oriented to person, place, and time  Skin: Skin is warm and dry  Psychiatric: He has a normal mood and affect  Nursing note and vitals reviewed  BARRIERS: none identified    GOALS:   · Continued/Maintain healthy weight loss with good nutrition intakes  · Adequate hydration with at least 64oz  fluid intake  · Normal vitamin and mineral levels  · Exercise as tolerated  · Try mixing protein powder, cottage cheese, or ricotta into oatmeal for more protein  · Make sure yogurt is around 13g sugar or less - mix low fat plain with a fruit flavor to decrease yogurt    · Follow-up in 3 months  We kindly ask that your arrive 15 minutes before your scheduled appointment time with your provider to allow our staff to room you, get your vital signs and update your chart  · Follow diet as discussed  · Get lab work done in the next 2 weeks  You have been given a lab slip today  Please call the office if you need a replacement  It is recommended to check with your insurance BEFORE getting labs done to make sure they are covered by your policy  Also, please check with your PCP and other providers before getting labs to avoid duplicate labs  Make sure to HOLD any multivitamins that may contain biotin and any biotin supplements FOR 5 DAYS before any labs since it can affect the results  · Follow vitamin and mineral recommendations as reviewed with you  · Call our office if you have any problems with abdominal pain especially associated with fever, chills, nausea, vomiting or any other concerns  · All  Post-bariatric surgery patients should be aware that very small quantities of any alcohol can cause impairment and it is very possible not to feel the effect  The effect can be in the system for several hours  It is also a stomach irritant  · It is advised to AVOID alcohol, Nonsteroidal antiinflammatory drugs (NSAIDS) and nicotine of all forms   Any of these can cause stomach irritation/pain

## 2019-02-15 LAB — HBA1C MFR BLD HPLC: 4.4 %

## 2019-03-04 ENCOUNTER — TELEPHONE (OUTPATIENT)
Dept: OTHER | Facility: HOSPITAL | Age: 59
End: 2019-03-04

## 2019-03-04 NOTE — TELEPHONE ENCOUNTER
Left message to return my call - would like to discuss most recent lab results  Noted iron deficiency anemia - need to confirm he has no bleeding and has had a recent colonoscopy

## 2019-03-05 ENCOUNTER — TELEPHONE (OUTPATIENT)
Dept: BARIATRICS | Facility: CLINIC | Age: 59
End: 2019-03-05

## 2019-03-05 NOTE — TELEPHONE ENCOUNTER
PT returned AMANDA George's telephone call  He is now home from work and available to speak with Rosie Rushing

## 2019-03-06 ENCOUNTER — TELEPHONE (OUTPATIENT)
Dept: BARIATRICS | Facility: CLINIC | Age: 59
End: 2019-03-06

## 2019-03-06 DIAGNOSIS — D50.9 IRON DEFICIENCY ANEMIA, UNSPECIFIED IRON DEFICIENCY ANEMIA TYPE: Primary | ICD-10-CM

## 2019-03-13 ENCOUNTER — TELEPHONE (OUTPATIENT)
Dept: BARIATRICS | Facility: CLINIC | Age: 59
End: 2019-03-13

## 2019-03-13 NOTE — TELEPHONE ENCOUNTER
Patient returned my call - he denies any blood in his stool or dark/tarry stools  He had a colonoscopy but unsure exactly when but may have been about 10 years ago  I am unable to find any records  Advised him to f/u with GI as he is likely due for follow up at this time  He has been taking Bariatric advantage MVI but does not always take these regularly  I advised him to be consistent with his supplements and add Vitron C additional iron 1-3/day and we will retest labs in 3 months  He confirms that his wife has already picked up Vitron C  He will contact the office if he develops any issues with bleeding or abdominal pain

## 2019-03-13 NOTE — TELEPHONE ENCOUNTER
I retrieved a message from the [employer] on the office clinical line this a m  PT received AMANDA George's telephone message and letter and is calling to discuss his lab results

## 2019-04-08 ENCOUNTER — HOSPITAL ENCOUNTER (EMERGENCY)
Facility: HOSPITAL | Age: 59
End: 2019-04-08
Attending: EMERGENCY MEDICINE | Admitting: EMERGENCY MEDICINE
Payer: COMMERCIAL

## 2019-04-08 ENCOUNTER — HOSPITAL ENCOUNTER (INPATIENT)
Facility: HOSPITAL | Age: 59
LOS: 4 days | Discharge: HOME | DRG: 326 | End: 2019-04-12
Attending: SURGERY | Admitting: SURGERY
Payer: COMMERCIAL

## 2019-04-08 VITALS
TEMPERATURE: 98.4 F | HEART RATE: 90 BPM | RESPIRATION RATE: 12 BRPM | BODY MASS INDEX: 31.52 KG/M2 | DIASTOLIC BLOOD PRESSURE: 91 MMHG | WEIGHT: 226 LBS | SYSTOLIC BLOOD PRESSURE: 153 MMHG | OXYGEN SATURATION: 99 %

## 2019-04-08 DIAGNOSIS — E66.9 DIABETES MELLITUS TYPE 2 IN OBESE (HCC): ICD-10-CM

## 2019-04-08 DIAGNOSIS — K92.2 UPPER GI BLEED: Primary | ICD-10-CM

## 2019-04-08 DIAGNOSIS — G47.33 OSA (OBSTRUCTIVE SLEEP APNEA): ICD-10-CM

## 2019-04-08 DIAGNOSIS — K92.2 LOWER GI BLEED: ICD-10-CM

## 2019-04-08 DIAGNOSIS — E11.69 DIABETES MELLITUS TYPE 2 IN OBESE (HCC): ICD-10-CM

## 2019-04-08 DIAGNOSIS — K92.2 UGI BLEED: ICD-10-CM

## 2019-04-08 DIAGNOSIS — K92.0 HEMATEMESIS: Primary | ICD-10-CM

## 2019-04-08 DIAGNOSIS — E66.01 MORBID OBESITY WITH BODY MASS INDEX (BMI) OF 40.0 TO 49.9 (HCC): Primary | ICD-10-CM

## 2019-04-08 DIAGNOSIS — R55 SYNCOPE: ICD-10-CM

## 2019-04-08 DIAGNOSIS — R10.84 GENERALIZED ABDOMINAL PAIN: ICD-10-CM

## 2019-04-08 DIAGNOSIS — K92.2 UPPER GI BLEED: ICD-10-CM

## 2019-04-08 DIAGNOSIS — I10 ESSENTIAL (PRIMARY) HYPERTENSION: ICD-10-CM

## 2019-04-08 LAB
ALBUMIN SERPL BCP-MCNC: 3.1 G/DL (ref 3.5–5)
ALP SERPL-CCNC: 64 U/L (ref 46–116)
ALT SERPL W P-5'-P-CCNC: 16 U/L (ref 12–78)
ANION GAP SERPL CALCULATED.3IONS-SCNC: 6 MMOL/L (ref 4–13)
APTT PPP: 30 SECONDS (ref 26–38)
AST SERPL W P-5'-P-CCNC: 11 U/L (ref 5–45)
ATRIAL RATE: 90 BPM
ATRIAL RATE: 90 BPM
BASOPHILS # BLD AUTO: 0.03 THOUSANDS/ΜL (ref 0–0.1)
BASOPHILS # BLD AUTO: 0.04 THOUSANDS/ΜL (ref 0–0.1)
BASOPHILS NFR BLD AUTO: 0 % (ref 0–1)
BASOPHILS NFR BLD AUTO: 1 % (ref 0–1)
BILIRUB SERPL-MCNC: 0.4 MG/DL (ref 0.2–1)
BUN SERPL-MCNC: 25 MG/DL (ref 5–25)
CALCIUM SERPL-MCNC: 8.7 MG/DL (ref 8.3–10.1)
CHLORIDE SERPL-SCNC: 107 MMOL/L (ref 100–108)
CO2 SERPL-SCNC: 30 MMOL/L (ref 21–32)
CREAT SERPL-MCNC: 1.07 MG/DL (ref 0.6–1.3)
EOSINOPHIL # BLD AUTO: 0.01 THOUSAND/ΜL (ref 0–0.61)
EOSINOPHIL # BLD AUTO: 0.03 THOUSAND/ΜL (ref 0–0.61)
EOSINOPHIL NFR BLD AUTO: 0 % (ref 0–6)
EOSINOPHIL NFR BLD AUTO: 0 % (ref 0–6)
ERYTHROCYTE [DISTWIDTH] IN BLOOD BY AUTOMATED COUNT: 13.9 % (ref 11.6–15.1)
ERYTHROCYTE [DISTWIDTH] IN BLOOD BY AUTOMATED COUNT: 13.9 % (ref 11.6–15.1)
GFR SERPL CREATININE-BSD FRML MDRD: 75 ML/MIN/1.73SQ M
GLUCOSE SERPL-MCNC: 101 MG/DL (ref 65–140)
GLUCOSE SERPL-MCNC: 123 MG/DL (ref 65–140)
HCT VFR BLD AUTO: 26.5 % (ref 36.5–49.3)
HCT VFR BLD AUTO: 31 % (ref 36.5–49.3)
HGB BLD-MCNC: 10 G/DL (ref 12–17)
HGB BLD-MCNC: 8.4 G/DL (ref 12–17)
HOLD SPECIMEN: NORMAL
IMM GRANULOCYTES # BLD AUTO: 0.02 THOUSAND/UL (ref 0–0.2)
IMM GRANULOCYTES # BLD AUTO: 0.02 THOUSAND/UL (ref 0–0.2)
IMM GRANULOCYTES NFR BLD AUTO: 0 % (ref 0–2)
IMM GRANULOCYTES NFR BLD AUTO: 0 % (ref 0–2)
INR PPP: 1.19 (ref 0.86–1.17)
LYMPHOCYTES # BLD AUTO: 1.51 THOUSANDS/ΜL (ref 0.6–4.47)
LYMPHOCYTES # BLD AUTO: 1.93 THOUSANDS/ΜL (ref 0.6–4.47)
LYMPHOCYTES NFR BLD AUTO: 19 % (ref 14–44)
LYMPHOCYTES NFR BLD AUTO: 25 % (ref 14–44)
MCH RBC QN AUTO: 31.1 PG (ref 26.8–34.3)
MCH RBC QN AUTO: 31.2 PG (ref 26.8–34.3)
MCHC RBC AUTO-ENTMCNC: 31.7 G/DL (ref 31.4–37.4)
MCHC RBC AUTO-ENTMCNC: 32.3 G/DL (ref 31.4–37.4)
MCV RBC AUTO: 96 FL (ref 82–98)
MCV RBC AUTO: 99 FL (ref 82–98)
MONOCYTES # BLD AUTO: 0.33 THOUSAND/ΜL (ref 0.17–1.22)
MONOCYTES # BLD AUTO: 0.51 THOUSAND/ΜL (ref 0.17–1.22)
MONOCYTES NFR BLD AUTO: 4 % (ref 4–12)
MONOCYTES NFR BLD AUTO: 6 % (ref 4–12)
NEUTROPHILS # BLD AUTO: 5.57 THOUSANDS/ΜL (ref 1.85–7.62)
NEUTROPHILS # BLD AUTO: 5.81 THOUSANDS/ΜL (ref 1.85–7.62)
NEUTS SEG NFR BLD AUTO: 71 % (ref 43–75)
NEUTS SEG NFR BLD AUTO: 74 % (ref 43–75)
NRBC BLD AUTO-RTO: 0 /100 WBCS
NRBC BLD AUTO-RTO: 0 /100 WBCS
P AXIS: 45 DEGREES
P AXIS: 55 DEGREES
PLATELET # BLD AUTO: 258 THOUSANDS/UL (ref 149–390)
PLATELET # BLD AUTO: 278 THOUSANDS/UL (ref 149–390)
PMV BLD AUTO: 9.1 FL (ref 8.9–12.7)
PMV BLD AUTO: 9.6 FL (ref 8.9–12.7)
POTASSIUM SERPL-SCNC: 4.4 MMOL/L (ref 3.5–5.3)
PR INTERVAL: 124 MS
PR INTERVAL: 124 MS
PROT SERPL-MCNC: 6.4 G/DL (ref 6.4–8.2)
PROTHROMBIN TIME: 14.9 SECONDS (ref 11.8–14.2)
QRS AXIS: 14 DEGREES
QRS AXIS: 7 DEGREES
QRSD INTERVAL: 84 MS
QRSD INTERVAL: 86 MS
QT INTERVAL: 374 MS
QT INTERVAL: 378 MS
QTC INTERVAL: 457 MS
QTC INTERVAL: 462 MS
RBC # BLD AUTO: 2.69 MILLION/UL (ref 3.88–5.62)
RBC # BLD AUTO: 3.22 MILLION/UL (ref 3.88–5.62)
SODIUM SERPL-SCNC: 143 MMOL/L (ref 136–145)
T WAVE AXIS: -32 DEGREES
T WAVE AXIS: -34 DEGREES
TROPONIN I SERPL-MCNC: 0.02 NG/ML
TROPONIN I SERPL-MCNC: 0.03 NG/ML
TROPONIN I SERPL-MCNC: <0.02 NG/ML
VENTRICULAR RATE: 90 BPM
VENTRICULAR RATE: 90 BPM
WBC # BLD AUTO: 7.89 THOUSAND/UL (ref 4.31–10.16)
WBC # BLD AUTO: 7.92 THOUSAND/UL (ref 4.31–10.16)

## 2019-04-08 PROCEDURE — 85025 COMPLETE CBC W/AUTO DIFF WBC: CPT | Performed by: EMERGENCY MEDICINE

## 2019-04-08 PROCEDURE — 85610 PROTHROMBIN TIME: CPT | Performed by: EMERGENCY MEDICINE

## 2019-04-08 PROCEDURE — 96361 HYDRATE IV INFUSION ADD-ON: CPT

## 2019-04-08 PROCEDURE — 85025 COMPLETE CBC W/AUTO DIFF WBC: CPT | Performed by: PHYSICIAN ASSISTANT

## 2019-04-08 PROCEDURE — 99283 EMERGENCY DEPT VISIT LOW MDM: CPT | Performed by: EMERGENCY MEDICINE

## 2019-04-08 PROCEDURE — 93010 ELECTROCARDIOGRAM REPORT: CPT | Performed by: INTERNAL MEDICINE

## 2019-04-08 PROCEDURE — 82948 REAGENT STRIP/BLOOD GLUCOSE: CPT

## 2019-04-08 PROCEDURE — 84484 ASSAY OF TROPONIN QUANT: CPT | Performed by: EMERGENCY MEDICINE

## 2019-04-08 PROCEDURE — 85730 THROMBOPLASTIN TIME PARTIAL: CPT | Performed by: EMERGENCY MEDICINE

## 2019-04-08 PROCEDURE — 86850 RBC ANTIBODY SCREEN: CPT | Performed by: EMERGENCY MEDICINE

## 2019-04-08 PROCEDURE — C9113 INJ PANTOPRAZOLE SODIUM, VIA: HCPCS | Performed by: EMERGENCY MEDICINE

## 2019-04-08 PROCEDURE — 86900 BLOOD TYPING SEROLOGIC ABO: CPT | Performed by: EMERGENCY MEDICINE

## 2019-04-08 PROCEDURE — 96374 THER/PROPH/DIAG INJ IV PUSH: CPT

## 2019-04-08 PROCEDURE — 99285 EMERGENCY DEPT VISIT HI MDM: CPT

## 2019-04-08 PROCEDURE — 36415 COLL VENOUS BLD VENIPUNCTURE: CPT | Performed by: EMERGENCY MEDICINE

## 2019-04-08 PROCEDURE — 93005 ELECTROCARDIOGRAM TRACING: CPT

## 2019-04-08 PROCEDURE — 96375 TX/PRO/DX INJ NEW DRUG ADDON: CPT

## 2019-04-08 PROCEDURE — 84484 ASSAY OF TROPONIN QUANT: CPT | Performed by: PHYSICIAN ASSISTANT

## 2019-04-08 PROCEDURE — 86901 BLOOD TYPING SEROLOGIC RH(D): CPT | Performed by: EMERGENCY MEDICINE

## 2019-04-08 PROCEDURE — 80053 COMPREHEN METABOLIC PANEL: CPT | Performed by: EMERGENCY MEDICINE

## 2019-04-08 PROCEDURE — 99220 PR INITIAL OBSERVATION CARE/DAY 70 MINUTES: CPT | Performed by: PHYSICIAN ASSISTANT

## 2019-04-08 RX ORDER — LABETALOL 20 MG/4 ML (5 MG/ML) INTRAVENOUS SYRINGE
5 EVERY 4 HOURS PRN
Status: DISCONTINUED | OUTPATIENT
Start: 2019-04-08 | End: 2019-04-08

## 2019-04-08 RX ORDER — PANTOPRAZOLE SODIUM 40 MG/1
40 INJECTION, POWDER, FOR SOLUTION INTRAVENOUS ONCE
Status: COMPLETED | OUTPATIENT
Start: 2019-04-08 | End: 2019-04-08

## 2019-04-08 RX ORDER — ONDANSETRON 2 MG/ML
INJECTION INTRAMUSCULAR; INTRAVENOUS
Status: COMPLETED
Start: 2019-04-08 | End: 2019-04-08

## 2019-04-08 RX ORDER — ONDANSETRON 2 MG/ML
4 INJECTION INTRAMUSCULAR; INTRAVENOUS ONCE
Status: COMPLETED | OUTPATIENT
Start: 2019-04-08 | End: 2019-04-08

## 2019-04-08 RX ORDER — PANTOPRAZOLE SODIUM 40 MG/1
40 TABLET, DELAYED RELEASE ORAL
Status: DISCONTINUED | OUTPATIENT
Start: 2019-04-09 | End: 2019-04-12 | Stop reason: HOSPADM

## 2019-04-08 RX ORDER — SUCRALFATE ORAL 1 G/10ML
1000 SUSPENSION ORAL EVERY 6 HOURS SCHEDULED
Status: DISCONTINUED | OUTPATIENT
Start: 2019-04-08 | End: 2019-04-12 | Stop reason: HOSPADM

## 2019-04-08 RX ORDER — HYDRALAZINE HYDROCHLORIDE 20 MG/ML
10 INJECTION INTRAMUSCULAR; INTRAVENOUS EVERY 6 HOURS PRN
Status: DISCONTINUED | OUTPATIENT
Start: 2019-04-08 | End: 2019-04-12 | Stop reason: HOSPADM

## 2019-04-08 RX ORDER — SUCRALFATE ORAL 1 G/10ML
1000 SUSPENSION ORAL ONCE
Status: COMPLETED | OUTPATIENT
Start: 2019-04-08 | End: 2019-04-08

## 2019-04-08 RX ORDER — SODIUM CHLORIDE, SODIUM LACTATE, POTASSIUM CHLORIDE, CALCIUM CHLORIDE 600; 310; 30; 20 MG/100ML; MG/100ML; MG/100ML; MG/100ML
25 INJECTION, SOLUTION INTRAVENOUS CONTINUOUS
Status: DISCONTINUED | OUTPATIENT
Start: 2019-04-08 | End: 2019-04-12 | Stop reason: HOSPADM

## 2019-04-08 RX ORDER — PANTOPRAZOLE SODIUM 40 MG/1
40 INJECTION, POWDER, FOR SOLUTION INTRAVENOUS EVERY 12 HOURS SCHEDULED
Status: DISCONTINUED | OUTPATIENT
Start: 2019-04-08 | End: 2019-04-08

## 2019-04-08 RX ORDER — LABETALOL 20 MG/4 ML (5 MG/ML) INTRAVENOUS SYRINGE
10 EVERY 6 HOURS PRN
Status: DISCONTINUED | OUTPATIENT
Start: 2019-04-08 | End: 2019-04-09

## 2019-04-08 RX ADMIN — PANTOPRAZOLE SODIUM 40 MG: 40 INJECTION, POWDER, FOR SOLUTION INTRAVENOUS at 13:13

## 2019-04-08 RX ADMIN — SODIUM CHLORIDE 1000 ML: 0.9 INJECTION, SOLUTION INTRAVENOUS at 12:02

## 2019-04-08 RX ADMIN — SODIUM CHLORIDE, SODIUM LACTATE, POTASSIUM CHLORIDE, AND CALCIUM CHLORIDE 125 ML/HR: .6; .31; .03; .02 INJECTION, SOLUTION INTRAVENOUS at 16:36

## 2019-04-08 RX ADMIN — SUCRALFATE 1000 MG: 1 SUSPENSION ORAL at 13:41

## 2019-04-08 RX ADMIN — SODIUM CHLORIDE 1000 ML: 0.9 INJECTION, SOLUTION INTRAVENOUS at 10:43

## 2019-04-08 RX ADMIN — ONDANSETRON 4 MG: 2 INJECTION INTRAMUSCULAR; INTRAVENOUS at 11:32

## 2019-04-08 RX ADMIN — SUCRALFATE 1000 MG: 1 SUSPENSION ORAL at 18:29

## 2019-04-08 RX ADMIN — HYDRALAZINE HYDROCHLORIDE 10 MG: 20 INJECTION INTRAMUSCULAR; INTRAVENOUS at 21:20

## 2019-04-08 RX ADMIN — LABETALOL 20 MG/4 ML (5 MG/ML) INTRAVENOUS SYRINGE 5 MG: at 18:37

## 2019-04-09 ENCOUNTER — ANESTHESIA (OUTPATIENT)
Dept: GASTROENTEROLOGY | Facility: HOSPITAL | Age: 59
DRG: 326 | End: 2019-04-09
Payer: COMMERCIAL

## 2019-04-09 ENCOUNTER — ANESTHESIA EVENT (OUTPATIENT)
Dept: GASTROENTEROLOGY | Facility: HOSPITAL | Age: 59
DRG: 326 | End: 2019-04-09
Payer: COMMERCIAL

## 2019-04-09 LAB
ABO GROUP BLD: NORMAL
ABO GROUP BLD: NORMAL
BASOPHILS # BLD AUTO: 0.03 THOUSANDS/ΜL (ref 0–0.1)
BASOPHILS NFR BLD AUTO: 1 % (ref 0–1)
BLD GP AB SCN SERPL QL: NEGATIVE
BLD GP AB SCN SERPL QL: NEGATIVE
EOSINOPHIL # BLD AUTO: 0.07 THOUSAND/ΜL (ref 0–0.61)
EOSINOPHIL NFR BLD AUTO: 1 % (ref 0–6)
ERYTHROCYTE [DISTWIDTH] IN BLOOD BY AUTOMATED COUNT: 13.9 % (ref 11.6–15.1)
ERYTHROCYTE [DISTWIDTH] IN BLOOD BY AUTOMATED COUNT: 14.5 % (ref 11.6–15.1)
ERYTHROCYTE [DISTWIDTH] IN BLOOD BY AUTOMATED COUNT: 14.9 % (ref 11.6–15.1)
HCT VFR BLD AUTO: 17.1 % (ref 36.5–49.3)
HCT VFR BLD AUTO: 20.8 % (ref 36.5–49.3)
HCT VFR BLD AUTO: 26.3 % (ref 36.5–49.3)
HGB BLD-MCNC: 5.4 G/DL (ref 12–17)
HGB BLD-MCNC: 6.5 G/DL (ref 12–17)
HGB BLD-MCNC: 8.3 G/DL (ref 12–17)
IMM GRANULOCYTES # BLD AUTO: 0.01 THOUSAND/UL (ref 0–0.2)
IMM GRANULOCYTES NFR BLD AUTO: 0 % (ref 0–2)
LYMPHOCYTES # BLD AUTO: 2.3 THOUSANDS/ΜL (ref 0.6–4.47)
LYMPHOCYTES NFR BLD AUTO: 38 % (ref 14–44)
MCH RBC QN AUTO: 31 PG (ref 26.8–34.3)
MCH RBC QN AUTO: 31.3 PG (ref 26.8–34.3)
MCH RBC QN AUTO: 31.4 PG (ref 26.8–34.3)
MCHC RBC AUTO-ENTMCNC: 31.3 G/DL (ref 31.4–37.4)
MCHC RBC AUTO-ENTMCNC: 31.6 G/DL (ref 31.4–37.4)
MCHC RBC AUTO-ENTMCNC: 31.6 G/DL (ref 31.4–37.4)
MCV RBC AUTO: 99 FL (ref 82–98)
MONOCYTES # BLD AUTO: 0.43 THOUSAND/ΜL (ref 0.17–1.22)
MONOCYTES NFR BLD AUTO: 7 % (ref 4–12)
NEUTROPHILS # BLD AUTO: 3.26 THOUSANDS/ΜL (ref 1.85–7.62)
NEUTS SEG NFR BLD AUTO: 53 % (ref 43–75)
NRBC BLD AUTO-RTO: 0 /100 WBCS
PLATELET # BLD AUTO: 171 THOUSANDS/UL (ref 149–390)
PLATELET # BLD AUTO: 190 THOUSANDS/UL (ref 149–390)
PLATELET # BLD AUTO: 238 THOUSANDS/UL (ref 149–390)
PMV BLD AUTO: 9.2 FL (ref 8.9–12.7)
PMV BLD AUTO: 9.2 FL (ref 8.9–12.7)
PMV BLD AUTO: 9.6 FL (ref 8.9–12.7)
RBC # BLD AUTO: 1.72 MILLION/UL (ref 3.88–5.62)
RBC # BLD AUTO: 2.1 MILLION/UL (ref 3.88–5.62)
RBC # BLD AUTO: 2.65 MILLION/UL (ref 3.88–5.62)
RH BLD: POSITIVE
RH BLD: POSITIVE
SPECIMEN EXPIRATION DATE: NORMAL
SPECIMEN EXPIRATION DATE: NORMAL
WBC # BLD AUTO: 15.37 THOUSAND/UL (ref 4.31–10.16)
WBC # BLD AUTO: 6.1 THOUSAND/UL (ref 4.31–10.16)
WBC # BLD AUTO: 8.22 THOUSAND/UL (ref 4.31–10.16)

## 2019-04-09 PROCEDURE — P9016 RBC LEUKOCYTES REDUCED: HCPCS

## 2019-04-09 PROCEDURE — 86901 BLOOD TYPING SEROLOGIC RH(D): CPT | Performed by: PHYSICIAN ASSISTANT

## 2019-04-09 PROCEDURE — 86920 COMPATIBILITY TEST SPIN: CPT

## 2019-04-09 PROCEDURE — 85027 COMPLETE CBC AUTOMATED: CPT | Performed by: SURGERY

## 2019-04-09 PROCEDURE — 3E0G8GC INTRODUCTION OF OTHER THERAPEUTIC SUBSTANCE INTO UPPER GI, VIA NATURAL OR ARTIFICIAL OPENING ENDOSCOPIC: ICD-10-PCS | Performed by: SURGERY

## 2019-04-09 PROCEDURE — 0W3P8ZZ CONTROL BLEEDING IN GASTROINTESTINAL TRACT, VIA NATURAL OR ARTIFICIAL OPENING ENDOSCOPIC: ICD-10-PCS | Performed by: SURGERY

## 2019-04-09 PROCEDURE — 99253 IP/OBS CNSLTJ NEW/EST LOW 45: CPT | Performed by: INTERNAL MEDICINE

## 2019-04-09 PROCEDURE — 86900 BLOOD TYPING SEROLOGIC ABO: CPT | Performed by: PHYSICIAN ASSISTANT

## 2019-04-09 PROCEDURE — 86850 RBC ANTIBODY SCREEN: CPT | Performed by: PHYSICIAN ASSISTANT

## 2019-04-09 PROCEDURE — 99254 IP/OBS CNSLTJ NEW/EST MOD 60: CPT | Performed by: INTERNAL MEDICINE

## 2019-04-09 PROCEDURE — NC001 PR NO CHARGE: Performed by: PHYSICIAN ASSISTANT

## 2019-04-09 PROCEDURE — 99232 SBSQ HOSP IP/OBS MODERATE 35: CPT | Performed by: SURGERY

## 2019-04-09 PROCEDURE — 43255 EGD CONTROL BLEEDING ANY: CPT | Performed by: SURGERY

## 2019-04-09 PROCEDURE — 85025 COMPLETE CBC W/AUTO DIFF WBC: CPT | Performed by: PHYSICIAN ASSISTANT

## 2019-04-09 RX ORDER — SODIUM CHLORIDE 9 MG/ML
125 INJECTION, SOLUTION INTRAVENOUS CONTINUOUS
Status: DISCONTINUED | OUTPATIENT
Start: 2019-04-09 | End: 2019-04-09

## 2019-04-09 RX ORDER — LIDOCAINE HYDROCHLORIDE 10 MG/ML
INJECTION, SOLUTION INFILTRATION; PERINEURAL AS NEEDED
Status: DISCONTINUED | OUTPATIENT
Start: 2019-04-09 | End: 2019-04-09 | Stop reason: SURG

## 2019-04-09 RX ORDER — ONDANSETRON 2 MG/ML
4 INJECTION INTRAMUSCULAR; INTRAVENOUS EVERY 8 HOURS PRN
Status: DISCONTINUED | OUTPATIENT
Start: 2019-04-09 | End: 2019-04-12 | Stop reason: HOSPADM

## 2019-04-09 RX ORDER — METOPROLOL TARTRATE 5 MG/5ML
2.5 INJECTION INTRAVENOUS EVERY 6 HOURS PRN
Status: DISCONTINUED | OUTPATIENT
Start: 2019-04-09 | End: 2019-04-12 | Stop reason: HOSPADM

## 2019-04-09 RX ORDER — SUCCINYLCHOLINE/SOD CL,ISO/PF 100 MG/5ML
SYRINGE (ML) INTRAVENOUS AS NEEDED
Status: DISCONTINUED | OUTPATIENT
Start: 2019-04-09 | End: 2019-04-09 | Stop reason: SURG

## 2019-04-09 RX ORDER — PROPOFOL 10 MG/ML
INJECTION, EMULSION INTRAVENOUS AS NEEDED
Status: DISCONTINUED | OUTPATIENT
Start: 2019-04-09 | End: 2019-04-09 | Stop reason: SURG

## 2019-04-09 RX ORDER — METOCLOPRAMIDE HYDROCHLORIDE 5 MG/ML
10 INJECTION INTRAMUSCULAR; INTRAVENOUS EVERY 6 HOURS PRN
Status: DISCONTINUED | OUTPATIENT
Start: 2019-04-09 | End: 2019-04-12 | Stop reason: HOSPADM

## 2019-04-09 RX ADMIN — PROPOFOL 200 MG: 10 INJECTION, EMULSION INTRAVENOUS at 12:07

## 2019-04-09 RX ADMIN — MORPHINE SULFATE 1 MG: 2 INJECTION, SOLUTION INTRAMUSCULAR; INTRAVENOUS at 20:20

## 2019-04-09 RX ADMIN — SUCRALFATE 1000 MG: 1 SUSPENSION ORAL at 00:03

## 2019-04-09 RX ADMIN — HYDRALAZINE HYDROCHLORIDE 10 MG: 20 INJECTION INTRAMUSCULAR; INTRAVENOUS at 14:26

## 2019-04-09 RX ADMIN — LIDOCAINE HYDROCHLORIDE 100 MG: 10 INJECTION, SOLUTION INFILTRATION; PERINEURAL at 12:07

## 2019-04-09 RX ADMIN — ONDANSETRON 4 MG: 2 INJECTION INTRAMUSCULAR; INTRAVENOUS at 16:28

## 2019-04-09 RX ADMIN — SODIUM CHLORIDE, SODIUM LACTATE, POTASSIUM CHLORIDE, AND CALCIUM CHLORIDE 125 ML/HR: .6; .31; .03; .02 INJECTION, SOLUTION INTRAVENOUS at 07:43

## 2019-04-09 RX ADMIN — SODIUM CHLORIDE 125 ML/HR: 0.9 INJECTION, SOLUTION INTRAVENOUS at 11:59

## 2019-04-09 RX ADMIN — MORPHINE SULFATE 1 MG: 2 INJECTION, SOLUTION INTRAMUSCULAR; INTRAVENOUS at 16:29

## 2019-04-09 RX ADMIN — SODIUM CHLORIDE, SODIUM LACTATE, POTASSIUM CHLORIDE, AND CALCIUM CHLORIDE 125 ML/HR: .6; .31; .03; .02 INJECTION, SOLUTION INTRAVENOUS at 00:04

## 2019-04-09 RX ADMIN — SUCRALFATE 1000 MG: 1 SUSPENSION ORAL at 05:59

## 2019-04-09 RX ADMIN — SODIUM CHLORIDE: 0.9 INJECTION, SOLUTION INTRAVENOUS at 12:58

## 2019-04-09 RX ADMIN — PANTOPRAZOLE SODIUM 40 MG: 40 TABLET, DELAYED RELEASE ORAL at 05:58

## 2019-04-09 RX ADMIN — SUCRALFATE 1000 MG: 1 SUSPENSION ORAL at 23:06

## 2019-04-09 RX ADMIN — Medication 100 MG: at 12:07

## 2019-04-09 RX ADMIN — SUCRALFATE 1000 MG: 1 SUSPENSION ORAL at 17:32

## 2019-04-09 RX ADMIN — PANTOPRAZOLE SODIUM 40 MG: 40 TABLET, DELAYED RELEASE ORAL at 17:32

## 2019-04-09 RX ADMIN — SODIUM CHLORIDE, SODIUM LACTATE, POTASSIUM CHLORIDE, AND CALCIUM CHLORIDE 125 ML/HR: .6; .31; .03; .02 INJECTION, SOLUTION INTRAVENOUS at 20:08

## 2019-04-10 ENCOUNTER — ANESTHESIA (INPATIENT)
Dept: PERIOP | Facility: HOSPITAL | Age: 59
DRG: 326 | End: 2019-04-10
Payer: COMMERCIAL

## 2019-04-10 ENCOUNTER — APPOINTMENT (INPATIENT)
Dept: RADIOLOGY | Facility: HOSPITAL | Age: 59
DRG: 326 | End: 2019-04-10
Payer: COMMERCIAL

## 2019-04-10 ENCOUNTER — ANESTHESIA EVENT (INPATIENT)
Dept: PERIOP | Facility: HOSPITAL | Age: 59
DRG: 326 | End: 2019-04-10
Payer: COMMERCIAL

## 2019-04-10 LAB
ABO GROUP BLD BPU: NORMAL
ANION GAP SERPL CALCULATED.3IONS-SCNC: 10 MMOL/L (ref 4–13)
ANION GAP SERPL CALCULATED.3IONS-SCNC: 9 MMOL/L (ref 4–13)
APTT PPP: 30 SECONDS (ref 26–38)
BASOPHILS # BLD AUTO: 0 THOUSANDS/ΜL (ref 0–0.1)
BASOPHILS NFR BLD AUTO: 0 % (ref 0–1)
BPU ID: NORMAL
BUN SERPL-MCNC: 32 MG/DL (ref 5–25)
BUN SERPL-MCNC: 34 MG/DL (ref 5–25)
CALCIUM SERPL-MCNC: 7.8 MG/DL (ref 8.3–10.1)
CALCIUM SERPL-MCNC: 8 MG/DL (ref 8.3–10.1)
CHLORIDE SERPL-SCNC: 108 MMOL/L (ref 100–108)
CHLORIDE SERPL-SCNC: 113 MMOL/L (ref 100–108)
CO2 SERPL-SCNC: 23 MMOL/L (ref 21–32)
CO2 SERPL-SCNC: 26 MMOL/L (ref 21–32)
CREAT SERPL-MCNC: 1.3 MG/DL (ref 0.6–1.3)
CREAT SERPL-MCNC: 1.5 MG/DL (ref 0.6–1.3)
CROSSMATCH: NORMAL
EOSINOPHIL # BLD AUTO: 0.03 THOUSAND/ΜL (ref 0–0.61)
EOSINOPHIL NFR BLD AUTO: 0 % (ref 0–6)
ERYTHROCYTE [DISTWIDTH] IN BLOOD BY AUTOMATED COUNT: 15.1 % (ref 11.6–15.1)
ERYTHROCYTE [DISTWIDTH] IN BLOOD BY AUTOMATED COUNT: 15.6 % (ref 11.6–15.1)
ERYTHROCYTE [DISTWIDTH] IN BLOOD BY AUTOMATED COUNT: 16 % (ref 11.6–15.1)
GFR SERPL CREATININE-BSD FRML MDRD: 50 ML/MIN/1.73SQ M
GFR SERPL CREATININE-BSD FRML MDRD: 59 ML/MIN/1.73SQ M
GLUCOSE SERPL-MCNC: 101 MG/DL (ref 65–140)
GLUCOSE SERPL-MCNC: 149 MG/DL (ref 65–140)
HCT VFR BLD AUTO: 21.9 % (ref 36.5–49.3)
HCT VFR BLD AUTO: 22 % (ref 36.5–49.3)
HCT VFR BLD AUTO: 22.7 % (ref 36.5–49.3)
HGB BLD-MCNC: 7 G/DL (ref 12–17)
HGB BLD-MCNC: 7.1 G/DL (ref 12–17)
HGB BLD-MCNC: 7.4 G/DL (ref 12–17)
IMM GRANULOCYTES # BLD AUTO: 0.06 THOUSAND/UL (ref 0–0.2)
IMM GRANULOCYTES NFR BLD AUTO: 1 % (ref 0–2)
INR PPP: 1.28 (ref 0.86–1.17)
LYMPHOCYTES # BLD AUTO: 0.92 THOUSANDS/ΜL (ref 0.6–4.47)
LYMPHOCYTES NFR BLD AUTO: 9 % (ref 14–44)
MCH RBC QN AUTO: 30.1 PG (ref 26.8–34.3)
MCH RBC QN AUTO: 30.4 PG (ref 26.8–34.3)
MCH RBC QN AUTO: 30.6 PG (ref 26.8–34.3)
MCHC RBC AUTO-ENTMCNC: 31.8 G/DL (ref 31.4–37.4)
MCHC RBC AUTO-ENTMCNC: 32.4 G/DL (ref 31.4–37.4)
MCHC RBC AUTO-ENTMCNC: 32.6 G/DL (ref 31.4–37.4)
MCV RBC AUTO: 93 FL (ref 82–98)
MCV RBC AUTO: 94 FL (ref 82–98)
MCV RBC AUTO: 96 FL (ref 82–98)
MONOCYTES # BLD AUTO: 0.67 THOUSAND/ΜL (ref 0.17–1.22)
MONOCYTES NFR BLD AUTO: 7 % (ref 4–12)
NEUTROPHILS # BLD AUTO: 8.46 THOUSANDS/ΜL (ref 1.85–7.62)
NEUTS SEG NFR BLD AUTO: 83 % (ref 43–75)
NRBC BLD AUTO-RTO: 0 /100 WBCS
PLATELET # BLD AUTO: 126 THOUSANDS/UL (ref 149–390)
PLATELET # BLD AUTO: 150 THOUSANDS/UL (ref 149–390)
PLATELET # BLD AUTO: 171 THOUSANDS/UL (ref 149–390)
PMV BLD AUTO: 9.6 FL (ref 8.9–12.7)
PMV BLD AUTO: 9.7 FL (ref 8.9–12.7)
PMV BLD AUTO: 9.7 FL (ref 8.9–12.7)
POTASSIUM SERPL-SCNC: 4.2 MMOL/L (ref 3.5–5.3)
POTASSIUM SERPL-SCNC: 4.3 MMOL/L (ref 3.5–5.3)
PROTHROMBIN TIME: 16.1 SECONDS (ref 11.8–14.2)
RBC # BLD AUTO: 2.3 MILLION/UL (ref 3.88–5.62)
RBC # BLD AUTO: 2.36 MILLION/UL (ref 3.88–5.62)
RBC # BLD AUTO: 2.42 MILLION/UL (ref 3.88–5.62)
SODIUM SERPL-SCNC: 143 MMOL/L (ref 136–145)
SODIUM SERPL-SCNC: 146 MMOL/L (ref 136–145)
UNIT DISPENSE STATUS: NORMAL
UNIT PRODUCT CODE: NORMAL
UNIT RH: NORMAL
WBC # BLD AUTO: 10.14 THOUSAND/UL (ref 4.31–10.16)
WBC # BLD AUTO: 10.98 THOUSAND/UL (ref 4.31–10.16)
WBC # BLD AUTO: 7.85 THOUSAND/UL (ref 4.31–10.16)

## 2019-04-10 PROCEDURE — 0DB64ZZ EXCISION OF STOMACH, PERCUTANEOUS ENDOSCOPIC APPROACH: ICD-10-PCS | Performed by: SURGERY

## 2019-04-10 PROCEDURE — 88307 TISSUE EXAM BY PATHOLOGIST: CPT | Performed by: PATHOLOGY

## 2019-04-10 PROCEDURE — 85027 COMPLETE CBC AUTOMATED: CPT | Performed by: SURGERY

## 2019-04-10 PROCEDURE — P9021 RED BLOOD CELLS UNIT: HCPCS

## 2019-04-10 PROCEDURE — P9016 RBC LEUKOCYTES REDUCED: HCPCS

## 2019-04-10 PROCEDURE — 0DB84ZZ EXCISION OF SMALL INTESTINE, PERCUTANEOUS ENDOSCOPIC APPROACH: ICD-10-PCS | Performed by: SURGERY

## 2019-04-10 PROCEDURE — 80048 BASIC METABOLIC PNL TOTAL CA: CPT | Performed by: PHYSICIAN ASSISTANT

## 2019-04-10 PROCEDURE — 80048 BASIC METABOLIC PNL TOTAL CA: CPT | Performed by: SURGERY

## 2019-04-10 PROCEDURE — 85027 COMPLETE CBC AUTOMATED: CPT | Performed by: ANESTHESIOLOGY

## 2019-04-10 PROCEDURE — P9017 PLASMA 1 DONOR FRZ W/IN 8 HR: HCPCS

## 2019-04-10 PROCEDURE — 85025 COMPLETE CBC W/AUTO DIFF WBC: CPT | Performed by: PHYSICIAN ASSISTANT

## 2019-04-10 PROCEDURE — 85610 PROTHROMBIN TIME: CPT | Performed by: ANESTHESIOLOGY

## 2019-04-10 PROCEDURE — 43659 UNLISTED LAPS PX STOMACH: CPT | Performed by: SURGERY

## 2019-04-10 PROCEDURE — 99232 SBSQ HOSP IP/OBS MODERATE 35: CPT | Performed by: INTERNAL MEDICINE

## 2019-04-10 PROCEDURE — 8E0W4CZ ROBOTIC ASSISTED PROCEDURE OF TRUNK REGION, PERCUTANEOUS ENDOSCOPIC APPROACH: ICD-10-PCS | Performed by: SURGERY

## 2019-04-10 PROCEDURE — 99232 SBSQ HOSP IP/OBS MODERATE 35: CPT | Performed by: SURGERY

## 2019-04-10 PROCEDURE — 0DNW4ZZ RELEASE PERITONEUM, PERCUTANEOUS ENDOSCOPIC APPROACH: ICD-10-PCS | Performed by: SURGERY

## 2019-04-10 PROCEDURE — 0D164ZA BYPASS STOMACH TO JEJUNUM, PERCUTANEOUS ENDOSCOPIC APPROACH: ICD-10-PCS | Performed by: SURGERY

## 2019-04-10 PROCEDURE — 85730 THROMBOPLASTIN TIME PARTIAL: CPT | Performed by: ANESTHESIOLOGY

## 2019-04-10 DEVICE — SEAMGUARD STPL REINF INTUITIVE SUREFORM 60 BLACK: Type: IMPLANTABLE DEVICE | Site: ABDOMEN | Status: FUNCTIONAL

## 2019-04-10 DEVICE — SEAMGUARD STPL REINF INTUITIVE SUREFORM 60 GREEN: Type: IMPLANTABLE DEVICE | Site: ABDOMEN | Status: FUNCTIONAL

## 2019-04-10 RX ORDER — HYDROMORPHONE HCL/PF 1 MG/ML
0.5 SYRINGE (ML) INJECTION
Status: DISCONTINUED | OUTPATIENT
Start: 2019-04-10 | End: 2019-04-10 | Stop reason: HOSPADM

## 2019-04-10 RX ORDER — ONDANSETRON 2 MG/ML
INJECTION INTRAMUSCULAR; INTRAVENOUS AS NEEDED
Status: DISCONTINUED | OUTPATIENT
Start: 2019-04-10 | End: 2019-04-10 | Stop reason: SURG

## 2019-04-10 RX ORDER — ONDANSETRON 2 MG/ML
4 INJECTION INTRAMUSCULAR; INTRAVENOUS ONCE
Status: DISCONTINUED | OUTPATIENT
Start: 2019-04-10 | End: 2019-04-10 | Stop reason: HOSPADM

## 2019-04-10 RX ORDER — BUPIVACAINE HYDROCHLORIDE AND EPINEPHRINE 5; 5 MG/ML; UG/ML
INJECTION, SOLUTION PERINEURAL AS NEEDED
Status: DISCONTINUED | OUTPATIENT
Start: 2019-04-10 | End: 2019-04-10 | Stop reason: HOSPADM

## 2019-04-10 RX ORDER — CEFAZOLIN SODIUM 2 G/50ML
SOLUTION INTRAVENOUS AS NEEDED
Status: DISCONTINUED | OUTPATIENT
Start: 2019-04-10 | End: 2019-04-10 | Stop reason: SURG

## 2019-04-10 RX ORDER — CEFAZOLIN SODIUM 2 G/50ML
2000 SOLUTION INTRAVENOUS EVERY 8 HOURS
Status: CANCELLED | OUTPATIENT
Start: 2019-04-10

## 2019-04-10 RX ORDER — SUCCINYLCHOLINE/SOD CL,ISO/PF 100 MG/5ML
SYRINGE (ML) INTRAVENOUS AS NEEDED
Status: DISCONTINUED | OUTPATIENT
Start: 2019-04-10 | End: 2019-04-10 | Stop reason: SURG

## 2019-04-10 RX ORDER — FENTANYL CITRATE/PF 50 MCG/ML
25 SYRINGE (ML) INJECTION
Status: COMPLETED | OUTPATIENT
Start: 2019-04-10 | End: 2019-04-10

## 2019-04-10 RX ORDER — ROCURONIUM BROMIDE 10 MG/ML
INJECTION, SOLUTION INTRAVENOUS AS NEEDED
Status: DISCONTINUED | OUTPATIENT
Start: 2019-04-10 | End: 2019-04-10 | Stop reason: SURG

## 2019-04-10 RX ORDER — FENTANYL CITRATE 50 UG/ML
INJECTION, SOLUTION INTRAMUSCULAR; INTRAVENOUS AS NEEDED
Status: DISCONTINUED | OUTPATIENT
Start: 2019-04-10 | End: 2019-04-10 | Stop reason: SURG

## 2019-04-10 RX ORDER — PROPOFOL 10 MG/ML
INJECTION, EMULSION INTRAVENOUS AS NEEDED
Status: DISCONTINUED | OUTPATIENT
Start: 2019-04-10 | End: 2019-04-10 | Stop reason: SURG

## 2019-04-10 RX ORDER — MAGNESIUM HYDROXIDE 1200 MG/15ML
LIQUID ORAL AS NEEDED
Status: DISCONTINUED | OUTPATIENT
Start: 2019-04-10 | End: 2019-04-10 | Stop reason: HOSPADM

## 2019-04-10 RX ORDER — LABETALOL 20 MG/4 ML (5 MG/ML) INTRAVENOUS SYRINGE
10 ONCE
Status: COMPLETED | OUTPATIENT
Start: 2019-04-10 | End: 2019-04-10

## 2019-04-10 RX ORDER — LIDOCAINE HYDROCHLORIDE 10 MG/ML
INJECTION, SOLUTION INFILTRATION; PERINEURAL AS NEEDED
Status: DISCONTINUED | OUTPATIENT
Start: 2019-04-10 | End: 2019-04-10 | Stop reason: SURG

## 2019-04-10 RX ORDER — METOPROLOL TARTRATE 5 MG/5ML
2.5 INJECTION INTRAVENOUS EVERY 6 HOURS
Status: DISCONTINUED | OUTPATIENT
Start: 2019-04-10 | End: 2019-04-11

## 2019-04-10 RX ORDER — SODIUM CHLORIDE 9 MG/ML
INJECTION, SOLUTION INTRAVENOUS CONTINUOUS PRN
Status: DISCONTINUED | OUTPATIENT
Start: 2019-04-10 | End: 2019-04-10 | Stop reason: SURG

## 2019-04-10 RX ORDER — LABETALOL 20 MG/4 ML (5 MG/ML) INTRAVENOUS SYRINGE
AS NEEDED
Status: DISCONTINUED | OUTPATIENT
Start: 2019-04-10 | End: 2019-04-10 | Stop reason: SURG

## 2019-04-10 RX ADMIN — SODIUM CHLORIDE, SODIUM LACTATE, POTASSIUM CHLORIDE, AND CALCIUM CHLORIDE 125 ML/HR: .6; .31; .03; .02 INJECTION, SOLUTION INTRAVENOUS at 23:37

## 2019-04-10 RX ADMIN — SODIUM CHLORIDE: 0.9 INJECTION, SOLUTION INTRAVENOUS at 08:03

## 2019-04-10 RX ADMIN — SODIUM CHLORIDE, SODIUM LACTATE, POTASSIUM CHLORIDE, AND CALCIUM CHLORIDE 125 ML/HR: .6; .31; .03; .02 INJECTION, SOLUTION INTRAVENOUS at 16:35

## 2019-04-10 RX ADMIN — LABETALOL 20 MG/4 ML (5 MG/ML) INTRAVENOUS SYRINGE 5 MG: at 11:01

## 2019-04-10 RX ADMIN — FENTANYL CITRATE 25 MCG: 50 INJECTION, SOLUTION INTRAMUSCULAR; INTRAVENOUS at 08:37

## 2019-04-10 RX ADMIN — FENTANYL CITRATE 25 MCG: 50 INJECTION, SOLUTION INTRAMUSCULAR; INTRAVENOUS at 11:32

## 2019-04-10 RX ADMIN — SODIUM CHLORIDE: 0.9 INJECTION, SOLUTION INTRAVENOUS at 11:02

## 2019-04-10 RX ADMIN — FENTANYL CITRATE 25 MCG: 50 INJECTION, SOLUTION INTRAMUSCULAR; INTRAVENOUS at 11:40

## 2019-04-10 RX ADMIN — FENTANYL CITRATE 50 MCG: 50 INJECTION, SOLUTION INTRAMUSCULAR; INTRAVENOUS at 10:36

## 2019-04-10 RX ADMIN — ROCURONIUM BROMIDE 10 MG: 10 INJECTION, SOLUTION INTRAVENOUS at 10:12

## 2019-04-10 RX ADMIN — SODIUM CHLORIDE, SODIUM LACTATE, POTASSIUM CHLORIDE, AND CALCIUM CHLORIDE 1000 ML: .6; .31; .03; .02 INJECTION, SOLUTION INTRAVENOUS at 14:08

## 2019-04-10 RX ADMIN — FENTANYL CITRATE 50 MCG: 50 INJECTION, SOLUTION INTRAMUSCULAR; INTRAVENOUS at 11:25

## 2019-04-10 RX ADMIN — SUGAMMADEX 200 MG: 100 INJECTION, SOLUTION INTRAVENOUS at 10:39

## 2019-04-10 RX ADMIN — PHENYLEPHRINE HYDROCHLORIDE 100 MCG: 10 INJECTION INTRAVENOUS at 08:17

## 2019-04-10 RX ADMIN — PANTOPRAZOLE SODIUM 40 MG: 40 TABLET, DELAYED RELEASE ORAL at 05:13

## 2019-04-10 RX ADMIN — SUCRALFATE 1000 MG: 1 SUSPENSION ORAL at 17:33

## 2019-04-10 RX ADMIN — METRONIDAZOLE 500 MG: 500 INJECTION, SOLUTION INTRAVENOUS at 23:37

## 2019-04-10 RX ADMIN — FENTANYL CITRATE 50 MCG: 50 INJECTION, SOLUTION INTRAMUSCULAR; INTRAVENOUS at 09:53

## 2019-04-10 RX ADMIN — METRONIDAZOLE 500 MG: 500 INJECTION, SOLUTION INTRAVENOUS at 15:36

## 2019-04-10 RX ADMIN — MORPHINE SULFATE 2 MG: 2 INJECTION, SOLUTION INTRAMUSCULAR; INTRAVENOUS at 20:50

## 2019-04-10 RX ADMIN — FENTANYL CITRATE 25 MCG: 50 INJECTION, SOLUTION INTRAMUSCULAR; INTRAVENOUS at 11:56

## 2019-04-10 RX ADMIN — FENTANYL CITRATE 100 MCG: 50 INJECTION, SOLUTION INTRAMUSCULAR; INTRAVENOUS at 08:05

## 2019-04-10 RX ADMIN — MORPHINE SULFATE 1 MG: 2 INJECTION, SOLUTION INTRAMUSCULAR; INTRAVENOUS at 14:06

## 2019-04-10 RX ADMIN — FENTANYL CITRATE 25 MCG: 50 INJECTION, SOLUTION INTRAMUSCULAR; INTRAVENOUS at 09:47

## 2019-04-10 RX ADMIN — PANTOPRAZOLE SODIUM 40 MG: 40 TABLET, DELAYED RELEASE ORAL at 16:14

## 2019-04-10 RX ADMIN — PHENYLEPHRINE HYDROCHLORIDE 50 MCG: 10 INJECTION INTRAVENOUS at 08:06

## 2019-04-10 RX ADMIN — LIDOCAINE HYDROCHLORIDE 50 MG: 10 INJECTION, SOLUTION INFILTRATION; PERINEURAL at 08:06

## 2019-04-10 RX ADMIN — MORPHINE SULFATE 1 MG: 2 INJECTION, SOLUTION INTRAMUSCULAR; INTRAVENOUS at 17:46

## 2019-04-10 RX ADMIN — ROCURONIUM BROMIDE 50 MG: 10 INJECTION, SOLUTION INTRAVENOUS at 08:10

## 2019-04-10 RX ADMIN — SODIUM CHLORIDE: 0.9 INJECTION, SOLUTION INTRAVENOUS at 08:42

## 2019-04-10 RX ADMIN — PROPOFOL 200 MG: 10 INJECTION, EMULSION INTRAVENOUS at 08:06

## 2019-04-10 RX ADMIN — FENTANYL CITRATE 25 MCG: 50 INJECTION, SOLUTION INTRAMUSCULAR; INTRAVENOUS at 11:49

## 2019-04-10 RX ADMIN — FENTANYL CITRATE 25 MCG: 50 INJECTION, SOLUTION INTRAMUSCULAR; INTRAVENOUS at 09:32

## 2019-04-10 RX ADMIN — LABETALOL 20 MG/4 ML (5 MG/ML) INTRAVENOUS SYRINGE 5 MG: at 11:25

## 2019-04-10 RX ADMIN — HYDROMORPHONE HYDROCHLORIDE 0.5 MG: 1 INJECTION, SOLUTION INTRAMUSCULAR; INTRAVENOUS; SUBCUTANEOUS at 12:11

## 2019-04-10 RX ADMIN — ROCURONIUM BROMIDE 20 MG: 10 INJECTION, SOLUTION INTRAVENOUS at 09:04

## 2019-04-10 RX ADMIN — SODIUM CHLORIDE, SODIUM LACTATE, POTASSIUM CHLORIDE, AND CALCIUM CHLORIDE: .6; .31; .03; .02 INJECTION, SOLUTION INTRAVENOUS at 08:47

## 2019-04-10 RX ADMIN — FENTANYL CITRATE 50 MCG: 50 INJECTION, SOLUTION INTRAMUSCULAR; INTRAVENOUS at 10:40

## 2019-04-10 RX ADMIN — Medication 500 MG: at 08:11

## 2019-04-10 RX ADMIN — LABETALOL 20 MG/4 ML (5 MG/ML) INTRAVENOUS SYRINGE 5 MG: at 10:41

## 2019-04-10 RX ADMIN — CEFAZOLIN SODIUM 2000 MG: 2 SOLUTION INTRAVENOUS at 08:02

## 2019-04-10 RX ADMIN — METOPROLOL TARTRATE 2.5 MG: 5 INJECTION, SOLUTION INTRAVENOUS at 15:31

## 2019-04-10 RX ADMIN — FENTANYL CITRATE 25 MCG: 50 INJECTION, SOLUTION INTRAMUSCULAR; INTRAVENOUS at 09:36

## 2019-04-10 RX ADMIN — ROCURONIUM BROMIDE 10 MG: 10 INJECTION, SOLUTION INTRAVENOUS at 09:19

## 2019-04-10 RX ADMIN — SUCRALFATE 1000 MG: 1 SUSPENSION ORAL at 05:13

## 2019-04-10 RX ADMIN — PHENYLEPHRINE HYDROCHLORIDE 20 MCG/MIN: 10 INJECTION INTRAVENOUS at 08:20

## 2019-04-10 RX ADMIN — ONDANSETRON 4 MG: 2 INJECTION INTRAMUSCULAR; INTRAVENOUS at 10:28

## 2019-04-10 RX ADMIN — Medication 100 MG: at 08:06

## 2019-04-10 RX ADMIN — LABETALOL 20 MG/4 ML (5 MG/ML) INTRAVENOUS SYRINGE 10 MG: at 17:34

## 2019-04-10 RX ADMIN — PHENYLEPHRINE HYDROCHLORIDE 100 MCG: 10 INJECTION INTRAVENOUS at 08:12

## 2019-04-10 RX ADMIN — SUCRALFATE 1000 MG: 1 SUSPENSION ORAL at 23:01

## 2019-04-10 RX ADMIN — HYDRALAZINE HYDROCHLORIDE 10 MG: 20 INJECTION INTRAMUSCULAR; INTRAVENOUS at 20:11

## 2019-04-10 RX ADMIN — METOPROLOL TARTRATE 2.5 MG: 5 INJECTION, SOLUTION INTRAVENOUS at 22:03

## 2019-04-11 ENCOUNTER — APPOINTMENT (OUTPATIENT)
Dept: RADIOLOGY | Facility: HOSPITAL | Age: 59
DRG: 326 | End: 2019-04-11
Payer: COMMERCIAL

## 2019-04-11 LAB
ABO GROUP BLD BPU: NORMAL
ANION GAP SERPL CALCULATED.3IONS-SCNC: 7 MMOL/L (ref 4–13)
BPU ID: NORMAL
BUN SERPL-MCNC: 32 MG/DL (ref 5–25)
CALCIUM SERPL-MCNC: 7.8 MG/DL (ref 8.3–10.1)
CHLORIDE SERPL-SCNC: 111 MMOL/L (ref 100–108)
CO2 SERPL-SCNC: 26 MMOL/L (ref 21–32)
CREAT SERPL-MCNC: 1.22 MG/DL (ref 0.6–1.3)
CROSSMATCH: NORMAL
CROSSMATCH: NORMAL
ERYTHROCYTE [DISTWIDTH] IN BLOOD BY AUTOMATED COUNT: 15.9 % (ref 11.6–15.1)
GFR SERPL CREATININE-BSD FRML MDRD: 64 ML/MIN/1.73SQ M
GLUCOSE SERPL-MCNC: 99 MG/DL (ref 65–140)
HCT VFR BLD AUTO: 19.8 % (ref 36.5–49.3)
HCT VFR BLD AUTO: 21.5 % (ref 36.5–49.3)
HGB BLD-MCNC: 6.4 G/DL (ref 12–17)
HGB BLD-MCNC: 7 G/DL (ref 12–17)
MAGNESIUM SERPL-MCNC: 1.4 MG/DL (ref 1.6–2.6)
MCH RBC QN AUTO: 30.8 PG (ref 26.8–34.3)
MCHC RBC AUTO-ENTMCNC: 32.8 G/DL (ref 31.4–37.4)
MCV RBC AUTO: 94 FL (ref 82–98)
PHOSPHATE SERPL-MCNC: 4 MG/DL (ref 2.3–4.1)
PLATELET # BLD AUTO: 152 THOUSANDS/UL (ref 149–390)
PMV BLD AUTO: 9.8 FL (ref 8.9–12.7)
POTASSIUM SERPL-SCNC: 3.9 MMOL/L (ref 3.5–5.3)
RBC # BLD AUTO: 2.11 MILLION/UL (ref 3.88–5.62)
SODIUM SERPL-SCNC: 144 MMOL/L (ref 136–145)
UNIT DISPENSE STATUS: NORMAL
UNIT PRODUCT CODE: NORMAL
UNIT RH: NORMAL
WBC # BLD AUTO: 8.79 THOUSAND/UL (ref 4.31–10.16)

## 2019-04-11 PROCEDURE — 80048 BASIC METABOLIC PNL TOTAL CA: CPT | Performed by: SURGERY

## 2019-04-11 PROCEDURE — 85027 COMPLETE CBC AUTOMATED: CPT | Performed by: SURGERY

## 2019-04-11 PROCEDURE — 99024 POSTOP FOLLOW-UP VISIT: CPT | Performed by: SURGERY

## 2019-04-11 PROCEDURE — 84100 ASSAY OF PHOSPHORUS: CPT | Performed by: SURGERY

## 2019-04-11 PROCEDURE — 83735 ASSAY OF MAGNESIUM: CPT | Performed by: SURGERY

## 2019-04-11 PROCEDURE — 85014 HEMATOCRIT: CPT | Performed by: PHYSICIAN ASSISTANT

## 2019-04-11 PROCEDURE — 99232 SBSQ HOSP IP/OBS MODERATE 35: CPT | Performed by: INTERNAL MEDICINE

## 2019-04-11 PROCEDURE — 85018 HEMOGLOBIN: CPT | Performed by: PHYSICIAN ASSISTANT

## 2019-04-11 PROCEDURE — 74240 X-RAY XM UPR GI TRC 1CNTRST: CPT

## 2019-04-11 RX ORDER — HYDROMORPHONE HCL/PF 1 MG/ML
0.5 SYRINGE (ML) INJECTION EVERY 2 HOUR PRN
Status: DISCONTINUED | OUTPATIENT
Start: 2019-04-11 | End: 2019-04-11

## 2019-04-11 RX ORDER — HYDROMORPHONE HCL/PF 1 MG/ML
1 SYRINGE (ML) INJECTION EVERY 2 HOUR PRN
Status: DISCONTINUED | OUTPATIENT
Start: 2019-04-11 | End: 2019-04-11

## 2019-04-11 RX ORDER — METOPROLOL TARTRATE 5 MG/5ML
5 INJECTION INTRAVENOUS EVERY 8 HOURS
Status: DISCONTINUED | OUTPATIENT
Start: 2019-04-11 | End: 2019-04-12 | Stop reason: HOSPADM

## 2019-04-11 RX ORDER — OXYCODONE HCL 5 MG/5 ML
10 SOLUTION, ORAL ORAL EVERY 4 HOURS PRN
Status: DISCONTINUED | OUTPATIENT
Start: 2019-04-11 | End: 2019-04-11

## 2019-04-11 RX ORDER — METOPROLOL TARTRATE 5 MG/5ML
5 INJECTION INTRAVENOUS EVERY 6 HOURS
Status: DISCONTINUED | OUTPATIENT
Start: 2019-04-11 | End: 2019-04-11

## 2019-04-11 RX ORDER — ACETAMINOPHEN 325 MG/1
975 TABLET ORAL EVERY 6 HOURS SCHEDULED
Status: DISCONTINUED | OUTPATIENT
Start: 2019-04-11 | End: 2019-04-12 | Stop reason: HOSPADM

## 2019-04-11 RX ORDER — HYDROMORPHONE HCL/PF 1 MG/ML
1 SYRINGE (ML) INJECTION EVERY 2 HOUR PRN
Status: DISCONTINUED | OUTPATIENT
Start: 2019-04-11 | End: 2019-04-12 | Stop reason: HOSPADM

## 2019-04-11 RX ORDER — OXYCODONE HCL 5 MG/5 ML
10 SOLUTION, ORAL ORAL EVERY 4 HOURS PRN
Status: DISCONTINUED | OUTPATIENT
Start: 2019-04-11 | End: 2019-04-12 | Stop reason: HOSPADM

## 2019-04-11 RX ORDER — HYDROMORPHONE HCL/PF 1 MG/ML
0.5 SYRINGE (ML) INJECTION EVERY 2 HOUR PRN
Status: DISCONTINUED | OUTPATIENT
Start: 2019-04-11 | End: 2019-04-12 | Stop reason: HOSPADM

## 2019-04-11 RX ADMIN — METOPROLOL TARTRATE 2.5 MG: 5 INJECTION, SOLUTION INTRAVENOUS at 03:28

## 2019-04-11 RX ADMIN — OXYCODONE HYDROCHLORIDE 10 MG: 5 SOLUTION ORAL at 15:43

## 2019-04-11 RX ADMIN — HYDROMORPHONE HYDROCHLORIDE 1 MG: 1 INJECTION, SOLUTION INTRAMUSCULAR; INTRAVENOUS; SUBCUTANEOUS at 09:22

## 2019-04-11 RX ADMIN — SUCRALFATE 1000 MG: 1 SUSPENSION ORAL at 11:29

## 2019-04-11 RX ADMIN — METOPROLOL TARTRATE 5 MG: 5 INJECTION, SOLUTION INTRAVENOUS at 15:43

## 2019-04-11 RX ADMIN — METOPROLOL TARTRATE 2.5 MG: 5 INJECTION, SOLUTION INTRAVENOUS at 09:28

## 2019-04-11 RX ADMIN — PANTOPRAZOLE SODIUM 40 MG: 40 TABLET, DELAYED RELEASE ORAL at 15:44

## 2019-04-11 RX ADMIN — IOHEXOL 50 ML: 350 INJECTION, SOLUTION INTRAVENOUS at 08:53

## 2019-04-11 RX ADMIN — SUCRALFATE 1000 MG: 1 SUSPENSION ORAL at 17:55

## 2019-04-11 RX ADMIN — SODIUM CHLORIDE, SODIUM LACTATE, POTASSIUM CHLORIDE, AND CALCIUM CHLORIDE 75 ML/HR: .6; .31; .03; .02 INJECTION, SOLUTION INTRAVENOUS at 08:03

## 2019-04-11 RX ADMIN — SUCRALFATE 1000 MG: 1 SUSPENSION ORAL at 06:07

## 2019-04-11 RX ADMIN — ACETAMINOPHEN 975 MG: 325 TABLET ORAL at 17:55

## 2019-04-11 RX ADMIN — PANTOPRAZOLE SODIUM 40 MG: 40 TABLET, DELAYED RELEASE ORAL at 06:06

## 2019-04-11 RX ADMIN — MORPHINE SULFATE 2 MG: 2 INJECTION, SOLUTION INTRAMUSCULAR; INTRAVENOUS at 05:18

## 2019-04-11 RX ADMIN — OXYCODONE HYDROCHLORIDE 10 MG: 5 SOLUTION ORAL at 21:53

## 2019-04-11 RX ADMIN — MORPHINE SULFATE 2 MG: 2 INJECTION, SOLUTION INTRAMUSCULAR; INTRAVENOUS at 00:50

## 2019-04-12 VITALS
BODY MASS INDEX: 35.43 KG/M2 | SYSTOLIC BLOOD PRESSURE: 155 MMHG | HEART RATE: 79 BPM | RESPIRATION RATE: 16 BRPM | HEIGHT: 71 IN | DIASTOLIC BLOOD PRESSURE: 71 MMHG | WEIGHT: 253.09 LBS | TEMPERATURE: 98.3 F | OXYGEN SATURATION: 99 %

## 2019-04-12 PROBLEM — K92.2 UPPER GI BLEED: Status: RESOLVED | Noted: 2019-04-08 | Resolved: 2019-04-12

## 2019-04-12 LAB
ABO GROUP BLD: NORMAL
ANION GAP SERPL CALCULATED.3IONS-SCNC: 6 MMOL/L (ref 4–13)
BASOPHILS # BLD AUTO: 0.01 THOUSANDS/ΜL (ref 0–0.1)
BASOPHILS NFR BLD AUTO: 0 % (ref 0–1)
BLD GP AB SCN SERPL QL: NEGATIVE
BUN SERPL-MCNC: 26 MG/DL (ref 5–25)
CALCIUM SERPL-MCNC: 8.2 MG/DL (ref 8.3–10.1)
CHLORIDE SERPL-SCNC: 109 MMOL/L (ref 100–108)
CO2 SERPL-SCNC: 26 MMOL/L (ref 21–32)
CREAT SERPL-MCNC: 1.11 MG/DL (ref 0.6–1.3)
EOSINOPHIL # BLD AUTO: 0.29 THOUSAND/ΜL (ref 0–0.61)
EOSINOPHIL NFR BLD AUTO: 4 % (ref 0–6)
ERYTHROCYTE [DISTWIDTH] IN BLOOD BY AUTOMATED COUNT: 15.9 % (ref 11.6–15.1)
GFR SERPL CREATININE-BSD FRML MDRD: 71 ML/MIN/1.73SQ M
GLUCOSE SERPL-MCNC: 91 MG/DL (ref 65–140)
HCT VFR BLD AUTO: 20.3 % (ref 36.5–49.3)
HCT VFR BLD AUTO: 20.4 % (ref 36.5–49.3)
HGB BLD-MCNC: 6.4 G/DL (ref 12–17)
HGB BLD-MCNC: 6.5 G/DL (ref 12–17)
IMM GRANULOCYTES # BLD AUTO: 0.02 THOUSAND/UL (ref 0–0.2)
IMM GRANULOCYTES NFR BLD AUTO: 0 % (ref 0–2)
LYMPHOCYTES # BLD AUTO: 0.95 THOUSANDS/ΜL (ref 0.6–4.47)
LYMPHOCYTES NFR BLD AUTO: 13 % (ref 14–44)
MCH RBC QN AUTO: 31.1 PG (ref 26.8–34.3)
MCHC RBC AUTO-ENTMCNC: 32 G/DL (ref 31.4–37.4)
MCV RBC AUTO: 97 FL (ref 82–98)
MONOCYTES # BLD AUTO: 0.57 THOUSAND/ΜL (ref 0.17–1.22)
MONOCYTES NFR BLD AUTO: 8 % (ref 4–12)
NEUTROPHILS # BLD AUTO: 5.32 THOUSANDS/ΜL (ref 1.85–7.62)
NEUTS SEG NFR BLD AUTO: 75 % (ref 43–75)
NRBC BLD AUTO-RTO: 0 /100 WBCS
PLATELET # BLD AUTO: 199 THOUSANDS/UL (ref 149–390)
PMV BLD AUTO: 9.9 FL (ref 8.9–12.7)
POTASSIUM SERPL-SCNC: 3.8 MMOL/L (ref 3.5–5.3)
RBC # BLD AUTO: 2.09 MILLION/UL (ref 3.88–5.62)
RH BLD: POSITIVE
SODIUM SERPL-SCNC: 141 MMOL/L (ref 136–145)
SPECIMEN EXPIRATION DATE: NORMAL
WBC # BLD AUTO: 7.16 THOUSAND/UL (ref 4.31–10.16)

## 2019-04-12 PROCEDURE — 85025 COMPLETE CBC W/AUTO DIFF WBC: CPT | Performed by: PHYSICIAN ASSISTANT

## 2019-04-12 PROCEDURE — 86901 BLOOD TYPING SEROLOGIC RH(D): CPT | Performed by: PHYSICIAN ASSISTANT

## 2019-04-12 PROCEDURE — 99024 POSTOP FOLLOW-UP VISIT: CPT | Performed by: SURGERY

## 2019-04-12 PROCEDURE — 86900 BLOOD TYPING SEROLOGIC ABO: CPT | Performed by: PHYSICIAN ASSISTANT

## 2019-04-12 PROCEDURE — 86850 RBC ANTIBODY SCREEN: CPT | Performed by: PHYSICIAN ASSISTANT

## 2019-04-12 PROCEDURE — 85014 HEMATOCRIT: CPT | Performed by: PHYSICIAN ASSISTANT

## 2019-04-12 PROCEDURE — 85018 HEMOGLOBIN: CPT | Performed by: PHYSICIAN ASSISTANT

## 2019-04-12 PROCEDURE — 80048 BASIC METABOLIC PNL TOTAL CA: CPT | Performed by: PHYSICIAN ASSISTANT

## 2019-04-12 RX ORDER — OXYCODONE HYDROCHLORIDE AND ACETAMINOPHEN 5; 325 MG/1; MG/1
1 TABLET ORAL EVERY 4 HOURS PRN
Qty: 20 TABLET | Refills: 0 | Status: SHIPPED | OUTPATIENT
Start: 2019-04-12 | End: 2019-04-18 | Stop reason: ALTCHOICE

## 2019-04-12 RX ADMIN — ACETAMINOPHEN 975 MG: 325 TABLET ORAL at 00:01

## 2019-04-12 RX ADMIN — SUCRALFATE 1000 MG: 1 SUSPENSION ORAL at 05:48

## 2019-04-12 RX ADMIN — HYDRALAZINE HYDROCHLORIDE 10 MG: 20 INJECTION INTRAMUSCULAR; INTRAVENOUS at 10:38

## 2019-04-12 RX ADMIN — SUCRALFATE 1000 MG: 1 SUSPENSION ORAL at 11:13

## 2019-04-12 RX ADMIN — PANTOPRAZOLE SODIUM 40 MG: 40 TABLET, DELAYED RELEASE ORAL at 05:48

## 2019-04-12 RX ADMIN — ACETAMINOPHEN 975 MG: 325 TABLET ORAL at 05:48

## 2019-04-12 RX ADMIN — ACETAMINOPHEN 975 MG: 325 TABLET ORAL at 11:13

## 2019-04-12 RX ADMIN — METOPROLOL TARTRATE 5 MG: 5 INJECTION, SOLUTION INTRAVENOUS at 00:02

## 2019-04-12 RX ADMIN — SUCRALFATE 1000 MG: 1 SUSPENSION ORAL at 00:01

## 2019-04-12 RX ADMIN — METOPROLOL TARTRATE 5 MG: 5 INJECTION, SOLUTION INTRAVENOUS at 08:13

## 2019-04-15 ENCOUNTER — TELEPHONE (OUTPATIENT)
Dept: BARIATRICS | Facility: CLINIC | Age: 59
End: 2019-04-15

## 2019-04-18 ENCOUNTER — OFFICE VISIT (OUTPATIENT)
Dept: BARIATRICS | Facility: CLINIC | Age: 59
End: 2019-04-18

## 2019-04-18 VITALS
WEIGHT: 233.5 LBS | HEART RATE: 68 BPM | BODY MASS INDEX: 32.69 KG/M2 | SYSTOLIC BLOOD PRESSURE: 128 MMHG | TEMPERATURE: 97.1 F | HEIGHT: 71 IN | DIASTOLIC BLOOD PRESSURE: 68 MMHG | RESPIRATION RATE: 20 BRPM

## 2019-04-18 DIAGNOSIS — K92.2 UPPER GI BLEED: ICD-10-CM

## 2019-04-18 DIAGNOSIS — Z98.84 S/P GASTRIC BYPASS: Primary | ICD-10-CM

## 2019-04-18 DIAGNOSIS — E66.01 MORBID (SEVERE) OBESITY DUE TO EXCESS CALORIES (HCC): Primary | ICD-10-CM

## 2019-04-18 PROCEDURE — RECHECK: Performed by: DIETITIAN, REGISTERED

## 2019-04-18 PROCEDURE — 99024 POSTOP FOLLOW-UP VISIT: CPT | Performed by: SURGERY

## 2019-04-25 ENCOUNTER — TELEPHONE (OUTPATIENT)
Dept: BARIATRICS | Facility: CLINIC | Age: 59
End: 2019-04-25

## 2019-05-16 ENCOUNTER — OFFICE VISIT (OUTPATIENT)
Dept: BARIATRICS | Facility: CLINIC | Age: 59
End: 2019-05-16

## 2019-05-16 VITALS
SYSTOLIC BLOOD PRESSURE: 140 MMHG | WEIGHT: 220.7 LBS | HEART RATE: 82 BPM | HEIGHT: 71 IN | TEMPERATURE: 98.5 F | BODY MASS INDEX: 30.9 KG/M2 | DIASTOLIC BLOOD PRESSURE: 80 MMHG

## 2019-05-16 DIAGNOSIS — G47.33 OSA ON CPAP: ICD-10-CM

## 2019-05-16 DIAGNOSIS — Z99.89 OSA ON CPAP: ICD-10-CM

## 2019-05-16 DIAGNOSIS — Z98.84 S/P GASTRIC BYPASS: Primary | ICD-10-CM

## 2019-05-16 DIAGNOSIS — K92.2 UPPER GI BLEED: ICD-10-CM

## 2019-05-16 DIAGNOSIS — I10 ESSENTIAL (PRIMARY) HYPERTENSION: ICD-10-CM

## 2019-05-16 DIAGNOSIS — E66.9 DIABETES MELLITUS TYPE 2 IN OBESE (HCC): ICD-10-CM

## 2019-05-16 DIAGNOSIS — K91.2 POSTSURGICAL MALABSORPTION: ICD-10-CM

## 2019-05-16 DIAGNOSIS — E11.69 DIABETES MELLITUS TYPE 2 IN OBESE (HCC): ICD-10-CM

## 2019-05-16 PROCEDURE — 99024 POSTOP FOLLOW-UP VISIT: CPT | Performed by: PHYSICIAN ASSISTANT

## 2019-05-16 RX ORDER — HYDROCHLOROTHIAZIDE 12.5 MG/1
CAPSULE, GELATIN COATED ORAL
COMMUNITY
End: 2019-05-16 | Stop reason: ALTCHOICE

## 2019-05-16 RX ORDER — FELODIPINE 10 MG/1
TABLET, EXTENDED RELEASE ORAL
COMMUNITY

## 2019-05-16 RX ORDER — VALSARTAN 160 MG/1
TABLET ORAL
COMMUNITY

## 2019-05-31 ENCOUNTER — TELEPHONE (OUTPATIENT)
Dept: BARIATRICS | Facility: CLINIC | Age: 59
End: 2019-05-31

## 2019-05-31 DIAGNOSIS — D50.8 OTHER IRON DEFICIENCY ANEMIA: ICD-10-CM

## 2019-05-31 DIAGNOSIS — K91.2 POSTSURGICAL MALABSORPTION: ICD-10-CM

## 2019-05-31 DIAGNOSIS — Z98.84 S/P GASTRIC BYPASS: Primary | ICD-10-CM

## 2019-08-14 PROBLEM — Z48.815 ENCOUNTER FOR SURGICAL AFTERCARE FOLLOWING SURGERY OF DIGESTIVE SYSTEM: Status: ACTIVE | Noted: 2018-10-15

## 2019-08-15 ENCOUNTER — TELEPHONE (OUTPATIENT)
Dept: BARIATRICS | Facility: CLINIC | Age: 59
End: 2019-08-15

## 2019-08-15 NOTE — TELEPHONE ENCOUNTER
----- Message from Charlie Peguero PA-C sent at 8/15/2019 11:43 AM EDT -----  Regarding: Repeat labs  Please advise patient to obtain repeat lab work ordered for September 2nd so we can make sure that anemia is improving  Thanks!

## 2019-08-19 NOTE — ASSESSMENT & PLAN NOTE
-s/p Jacki-En-Y Gastric Bypass with Dr Chino Dutton on 10/15/18 and s/p CATHRYN, revision of the G-J on 04/10/19 r/t acute bleeding marginal ulcer  EWL is 80%, which places the patient ahead of schedule for expected post surgical weight loss at this time  He is feeling great and has no complaints  Initial: 336lbs  Current: 204 6lbs  EWL: 80%  Alexi: current  Current BMI is Body mass index is 28 54 kg/m²  · Tolerating a regular diet-yes  · Eating at least 60 grams of protein per day-yes  · Following 30/60 minute rule with liquids-yes  · Drinking at least 64 ounces of fluid per day-yes  · Drinking carbonated beverages-no  · Sufficient exercise-yes; walks daily and walks on golf [place] daily  · Using NSAIDs regularly-no  · Using nicotine-no  · Using alcohol-no   Advised about the risks of alcohol s/p bariatric surgery and recommend avoiding all alcohol

## 2019-08-19 NOTE — ASSESSMENT & PLAN NOTE
-s/p CATHRYN, revision of the G-J on 04/10/19 with Dr Tiffanie Clement r/t acute marginal ulcer  -Hgb was 6 5 when D/C'd from the hospital after revision   -Denies further GI bleeding, no blood in his stool, dark tarry stools  -Has been taking 45mg iron TID and Protonix 40mg daily   -Last Hgb 7 9 and ferritin 12 on 05/16 labs  -Continue with Vitron C TID and Protonix 40mg daily   -Reinforced the importance of consistent PPI use for full 6 months s/p revision  -Encouraged iron rich foods - provided examples  -He had iron studies done this morning - will f/u with results

## 2019-08-19 NOTE — ASSESSMENT & PLAN NOTE
-At risk for malabsorption of vitamins/minerals secondary to malabsorption and restriction of intake from bariatric surgery  -Currently taking adequate postop bariatric surgery vitamin supplementation: Bariatric Advantage MVI, 45mg iron TID  -Last set of bariatric labs completed on 05/16/19 and showed: Mildly low potassium (3 4), Hgb with slight improvement from 7 2 to 7 9, Platelets still elevated but have trended way down (759 to 417), Zinc slightly low, Mildly elevated B1   -He just completed iron studies this morning  Will f/u with results   Rest of bariatric panel ordered today to be done asap  -Patient received education about the importance of adhering to a lifelong supplementation regimen to avoid vitamin/mineral deficiencies

## 2019-08-20 ENCOUNTER — OFFICE VISIT (OUTPATIENT)
Dept: BARIATRICS | Facility: CLINIC | Age: 59
End: 2019-08-20
Payer: COMMERCIAL

## 2019-08-20 VITALS
SYSTOLIC BLOOD PRESSURE: 122 MMHG | WEIGHT: 204.6 LBS | DIASTOLIC BLOOD PRESSURE: 70 MMHG | HEART RATE: 67 BPM | BODY MASS INDEX: 28.64 KG/M2 | TEMPERATURE: 97.8 F | HEIGHT: 71 IN

## 2019-08-20 DIAGNOSIS — K92.2 UPPER GI BLEED: ICD-10-CM

## 2019-08-20 DIAGNOSIS — I10 ESSENTIAL (PRIMARY) HYPERTENSION: ICD-10-CM

## 2019-08-20 DIAGNOSIS — G47.33 OSA (OBSTRUCTIVE SLEEP APNEA): ICD-10-CM

## 2019-08-20 DIAGNOSIS — K91.2 POSTSURGICAL MALABSORPTION: ICD-10-CM

## 2019-08-20 DIAGNOSIS — Z48.815 ENCOUNTER FOR SURGICAL AFTERCARE FOLLOWING SURGERY OF DIGESTIVE SYSTEM: Primary | ICD-10-CM

## 2019-08-20 PROCEDURE — 3074F SYST BP LT 130 MM HG: CPT | Performed by: PHYSICIAN ASSISTANT

## 2019-08-20 PROCEDURE — 99214 OFFICE O/P EST MOD 30 MIN: CPT | Performed by: PHYSICIAN ASSISTANT

## 2019-08-20 NOTE — PROGRESS NOTES
Assessment/Plan:    Encounter for surgical aftercare following surgery of digestive system  -s/p Jacki-En-Y Gastric Bypass with Dr Anjum Perry on 10/15/18 and s/p CATHRYN, revision of the G-J on 04/10/19 r/t acute bleeding marginal ulcer  EWL is 80%, which places the patient ahead of schedule for expected post surgical weight loss at this time  He is feeling great and has no complaints  Initial: 336lbs  Current: 204 6lbs  EWL: 80%  Alexi: current  Current BMI is Body mass index is 28 54 kg/m²  · Tolerating a regular diet-yes  · Eating at least 60 grams of protein per day-yes  · Following 30/60 minute rule with liquids-yes  · Drinking at least 64 ounces of fluid per day-yes  · Drinking carbonated beverages-no  · Sufficient exercise-yes; walks daily and walks on golf course daily  · Using NSAIDs regularly-no  · Using nicotine-no  · Using alcohol-no   Advised about the risks of alcohol s/p bariatric surgery and recommend avoiding all alcohol      Upper GI bleed  -s/p CATHRYN, revision of the G-J on 04/10/19 with Dr Gisell Valdez r/t acute marginal ulcer  -Hgb was 6 5 when D/C'd from the hospital after revision   -Denies further GI bleeding, no blood in his stool, dark tarry stools  -Has been taking 45mg iron TID and Protonix 40mg daily   -Last Hgb 7 9 and ferritin 12 on 05/16 labs  -Continue with Vitron C TID and Protonix 40mg daily   -Reinforced the importance of consistent PPI use for full 6 months s/p revision  -Encouraged iron rich foods - provided examples  -He had iron studies done this morning - will f/u with results    Postsurgical malabsorption  -At risk for malabsorption of vitamins/minerals secondary to malabsorption and restriction of intake from bariatric surgery  -Currently taking adequate postop bariatric surgery vitamin supplementation: Bariatric Advantage MVI, 45mg iron TID  -Last set of bariatric labs completed on 05/16/19 and showed: Mildly low potassium (3 4), Hgb with slight improvement from 7 2 to 7 9, Platelets still elevated but have trended way down (759 to 417), Zinc slightly low, Mildly elevated B1   -He just completed iron studies this morning  Will f/u with results  Rest of bariatric panel ordered today to be done asap  -Patient received education about the importance of adhering to a lifelong supplementation regimen to avoid vitamin/mineral deficiencies     SALAS (obstructive sleep apnea)  -Wearing CPAP   -Encouraged consistent use of CPAP and follow up with sleep medicine for mask refitting/adjustments     Essential (primary) hypertension  -Good BP today  -On felodipine, labetalol  -[facility] monitoring and [facility] with [facility]       Diagnoses and all orders for this visit:    Encounter for surgical aftercare following surgery of digestive system  -     Copper Level; Future  -     PTH, intact; Future  -     Vitamin A; Future  -     Vitamin B12; Future  -     Vitamin D 25 hydroxy; Future  -     Zinc; Future  -     Folate; Future    Upper GI bleed    Postsurgical malabsorption  -     Copper Level; Future  -     PTH, intact; Future  -     Vitamin A; Future  -     Vitamin B12; Future  -     Vitamin D 25 hydroxy; Future  -     Zinc; Future  -     Folate; Future    SALAS (obstructive sleep apnea)    Essential (primary) hypertension          Subjective:      Patient ID: Christopher Paulson is a 62 y o  male  -s/p Jacki-En-Y Gastric Bypass with Dr Kait Church on 10/15/18 and s/p CATHRYN, revision of the G-J on 04/10/19 r/t acute bleeding marginal ulcer  Tolerating diet without issues; denies N/V, dysphagia, reflux  Feeling great, no complaints  Overall doing Well  The following portions of the patient's history were reviewed and updated as appropriate: allergies, current medications, past family history, past medical history, past social history, past surgical history and problem list     Review of Systems   Constitutional: Negative for chills and fever  Unexpected weight change: planned weight loss     HENT: Negative for trouble swallowing  Respiratory: Negative for cough and shortness of breath  Cardiovascular: Negative for chest pain and palpitations  Gastrointestinal: Negative for abdominal pain, constipation, diarrhea, nausea and vomiting  Neurological: Negative for dizziness  Psychiatric/Behavioral:        Denies anxiety and depression         Objective:      /70 (BP Location: Left arm, Patient Position: Sitting, Cuff Size: Large)   Pulse 67   Temp 97 8 °F (36 6 °C) (Tympanic)   Ht 5' 11" (1 803 m)   Wt 92 8 kg (204 lb 9 6 oz)   BMI 28 54 kg/m²          Physical Exam   Constitutional: He is oriented to person, place, and time  He appears well-developed and well-nourished  No distress  HENT:   Head: Normocephalic and atraumatic  Eyes: Pupils are equal, round, and reactive to light  No scleral icterus  Cardiovascular: Normal rate, regular rhythm and normal heart sounds  Pulmonary/Chest: Effort normal and breath sounds normal  No respiratory distress  Abdominal: Soft  Bowel sounds are normal  He exhibits no distension  There is no tenderness  No incisional hernias appreciated   Musculoskeletal: He exhibits no edema  Neurological: He is alert and oriented to person, place, and time  Skin: Skin is warm and dry  Psychiatric: He has a normal mood and affect  Nursing note and vitals reviewed  BARRIERS: none identified    GOALS:   · Continued/Maintain healthy weight loss with good nutrition intakes  · Adequate hydration with at least 64oz  fluid intake  · Normal vitamin and mineral levels - continue with Iron three times a day until advised otherwise (I will call you about your labs!)  · Exercise as tolerated  · Continue with Protonix - may start to slowly taper off in November     · Follow-up in 6 months   We kindly ask that your arrive 15 minutes before your scheduled appointment time with your provider to allow our staff to room you, get your vital signs and update your chart     · Follow diet as discussed  · Get lab work done in the next 2 weeks  You have been given a lab slip today  Please call the office if you need a replacement  It is recommended to check with your insurance BEFORE getting labs done to make sure they are covered by your policy  Also, please check with your PCP and other providers before getting labs to avoid duplicate labs  Make sure to HOLD any multivitamins that may contain biotin and any biotin supplements FOR 5 DAYS before any labs since it can affect the results  · Follow vitamin and mineral recommendations as reviewed with you  · Call our office if you have any problems with abdominal pain especially associated with fever, chills, nausea, vomiting or any other concerns  · All  Post-bariatric surgery patients should be aware that very small quantities of any alcohol can cause impairment and it is very possible not to feel the effect  The effect can be in the system for several hours  It is also a stomach irritant  · It is advised to AVOID alcohol, Nonsteroidal antiinflammatory drugs (NSAIDS) and nicotine of all forms   Any of these can cause stomach irritation/pain

## 2019-08-20 NOTE — ASSESSMENT & PLAN NOTE
-Wearing CPAP   -Encouraged consistent use of CPAP and follow up with sleep medicine for mask refitting/adjustments

## 2019-08-20 NOTE — PATIENT INSTRUCTIONS
GOALS:   · Continued/Maintain healthy weight loss with good nutrition intakes  · Adequate hydration with at least 64oz  fluid intake  · Normal vitamin and mineral levels - continue with Iron three times a day until advised otherwise (I will call you about your labs!)  · Exercise as tolerated  · Continue with Protonix - may start to slowly taper off in November     · Follow-up in 6 months  We kindly ask that your arrive 15 minutes before your scheduled appointment time with your provider to allow our staff to room you, get your vital signs and update your chart  · Follow diet as discussed  · Get lab work done in the next 2 weeks  You have been given a lab slip today  Please call the office if you need a replacement  It is recommended to check with your insurance BEFORE getting labs done to make sure they are covered by your policy  Also, please check with your PCP and other providers before getting labs to avoid duplicate labs  Make sure to HOLD any multivitamins that may contain biotin and any biotin supplements FOR 5 DAYS before any labs since it can affect the results  · Follow vitamin and mineral recommendations as reviewed with you  · Call our office if you have any problems with abdominal pain especially associated with fever, chills, nausea, vomiting or any other concerns  · All  Post-bariatric surgery patients should be aware that very small quantities of any alcohol can cause impairment and it is very possible not to feel the effect  The effect can be in the system for several hours  It is also a stomach irritant  · It is advised to AVOID alcohol, Nonsteroidal antiinflammatory drugs (NSAIDS) and nicotine of all forms   Any of these can cause stomach irritation/pain

## 2019-08-29 ENCOUNTER — TELEPHONE (OUTPATIENT)
Dept: BARIATRICS | Facility: CLINIC | Age: 59
End: 2019-08-29

## 2019-08-29 DIAGNOSIS — D64.9 ANEMIA: Primary | ICD-10-CM

## 2019-08-29 DIAGNOSIS — K91.2 POSTSURGICAL MALABSORPTION: ICD-10-CM

## 2019-08-29 NOTE — TELEPHONE ENCOUNTER
Left message, requesting call back  Most recent labs show mildly low potassium (3 4), ferritin dropped from 12 to 9, Hgb improved from 7 9 to 9 6  He is currently taking 65mg iron TID and 45mg of iron in his MVI  Advised he push potassium rich foods/fluids  Continue with Vitron C TID and repeat iron labs in 3 months  He has been asymptomatic and tolerates oral iron well, but I offered him the option of consulting with hematology for possible iron infusions

## 2019-09-30 ENCOUNTER — TELEPHONE (OUTPATIENT)
Dept: BARIATRICS | Facility: CLINIC | Age: 59
End: 2019-09-30

## 2019-09-30 ENCOUNTER — TELEPHONE (OUTPATIENT)
Dept: HEMATOLOGY ONCOLOGY | Facility: CLINIC | Age: 59
End: 2019-09-30

## 2019-09-30 DIAGNOSIS — D64.9 ANEMIA: ICD-10-CM

## 2019-09-30 DIAGNOSIS — K91.2 POSTSURGICAL MALABSORPTION: Primary | ICD-10-CM

## 2019-09-30 NOTE — TELEPHONE ENCOUNTER
Patient is requesting additional labs be drawn to determine why he feels so "lousy "  Patient has been following PA recommendations for potassium-rich foods and iron supplementation as discussed in August   However, he feels very exhausted and tired all the time  No other symptoms whatsoever, but this exhaustion just hasn't resolved and he thought he would feel better by now  The labs in his chart are marked for draw in November, but he doesn't feel like waiting til then  He would like to have some labs drawn sooner  I told him that Aida Real was in surgery today but I'd see she gets this message tomorrow  He said that he will be working but we can leave a message on his voice mail  He denied the need for an appointment at this time - he would just really like to check his lab values

## 2019-09-30 NOTE — TELEPHONE ENCOUNTER
Attempted to contact the patient - left a message  Advised him that he is now experiencing symptomatic anemia and I am referring him to hematology stat to be evaluated for iron infusions  I am also entering labs to be done now and advised him to continue taking oral iron and potassium rich foods/fluids  Advised him to call back with questions, especially if he is having blood in his stool, dark/tarry stools

## 2019-09-30 NOTE — TELEPHONE ENCOUNTER
New Patient Encounter    New Patient Intake Form   Patient Details:  Deshawn Franz  1960  [id number]    Background Information:  [address] starts by opening a telephone encounter and gathering the following information   Who is calling to schedule? If not self, relationship to patient? Angie   Referring Provider Josefina Flores   What is the diagnosis? anemia   When was the diagnosis? 05/18/2019   Is patient aware of diagnosis? Yes   Reason for visit? NP DX   Have you had any testing done? If so: when, where? Yes   Are records in [employer]? yes   Was the patient told to bring a disk? no   Scheduling Information:   Preferred [employer]:  [facility]     Requesting Specific Provider? no   Are there any dates/time the patient cannot be seen? no   Counseling Pre-Screen:  If the patient answers YES to any of the below questions, please route to the appropriate location specific counselor    Have you felt anxious or worried about cancer and the treatment you are receiving? No   Has your diagnosis caused physical, emotional, or financial hardship for you? No   Note: Do not ask the patient about transportation issues/needs  Please notate if the patient brings it up and the counselor will schedule accordingly  Miscellaneous: Would like to come in sooner, was put on the call wait list   After completing the above information, please route to Financial Counselor and the appropriate Nurse Navigator for review

## 2019-11-12 ENCOUNTER — TELEPHONE (OUTPATIENT)
Dept: BARIATRICS | Facility: CLINIC | Age: 59
End: 2019-11-12

## 2019-11-12 NOTE — TELEPHONE ENCOUNTER
Left message:     -Asked patient to call me back to discuss symptoms of fatigue  He has hematology appointment scheduled for 12/17/19 - advise he keep this appointment     -Anemia is improving  Hgb went from 9 6 in August to 11 3  Iron is now WNL at 47, however, ferritin and iron saturation are still very low  Continue with Vitron C TID and Procare with 45mg iron and f/u with hematology next month  -Potassium now WNL   Continue with potassium rich foods and fluids     -Repeat labs in 3 months or hematology will start monitoring

## 2019-12-19 ENCOUNTER — TELEPHONE (OUTPATIENT)
Dept: HEMATOLOGY ONCOLOGY | Facility: CLINIC | Age: 59
End: 2019-12-19

## 2019-12-19 NOTE — TELEPHONE ENCOUNTER
Called and spoke to patient, said blood counts are coming up and he feels good  He is going to see Dr Srinivas Bruce and they will decide if he should come or not  He also has new insurance and wants to wait until January  He said he will call back after the appointment with Dr Srinivas Bruce

## 2020-01-06 PROBLEM — D64.9 ANEMIA: Status: ACTIVE | Noted: 2019-04-08

## 2020-01-09 ENCOUNTER — TELEPHONE (OUTPATIENT)
Dept: BARIATRICS | Facility: CLINIC | Age: 60
End: 2020-01-09

## 2020-09-01 ENCOUNTER — TELEPHONE (OUTPATIENT)
Dept: BARIATRICS | Facility: CLINIC | Age: 60
End: 2020-09-01

## 2020-09-01 NOTE — TELEPHONE ENCOUNTER
----- Message from Yohannes Restrepo RN sent at 2020 12:40 PM EDT -----  Regardin year f/u appointment  Please contact patient to schedule a 1 year follow up appointment    Thank You

## 2021-03-01 ENCOUNTER — TELEPHONE (OUTPATIENT)
Dept: BARIATRICS | Facility: CLINIC | Age: 61
End: 2021-03-01

## 2021-03-01 NOTE — TELEPHONE ENCOUNTER
----- Message from Alisha Jackson RN sent at 3/1/2021  7:59 AM EST -----  Regardin year f/u appointment  Please contact patient to schedule a 2 year follow up appointment    Thank You

## 2021-06-23 NOTE — PROGRESS NOTES
Assessment/Plan: Morbid obesity with body mass index (BMI) of 40 0 to 49 9 ([facility] )  Interested in Vertical Sleeve Gastrectomy with Dr Sherrill Guallpa  10% Weight loss-Not applicable   Screening labs-CBC, CMP, A1c, and LP within acceptable limits from 1/12/18  Check TSH, may tiburcio to repeat CBC and CMP prior to surgery  Support Group-Not Completed  Cardiac Risk Assessment-Not Completed  Upper Endoscopy-Not Completed  Sleep Medicine Assessment-using CPAP  Alcohol and nicotine use is not recommended following bariatric surgery  NSAIDs should be avoided following bariatric surgery    SALAS (obstructive sleep apnea)  -encouraged continued use of CPAP machine    Benign essential HTN  -taking valsartan, felodipine, and labetolol    GERD (gastroesophageal reflux disease)  -taking pantoprazole    Follow up in approximately 1 month with Bariatric Surgeon  45 minute visit, >50% time spent counseling patient on diet behavior and exercise modification for weight loss  Reviewed the importance of following the lessons in the manual and the dietary, behavioral, and exercise changes for long-term success following bariatric surgery  Goals:  Food log (ie ) www myfitnesspal com,sparkpeople  com,loseit com,calorieking  com,etc  baritastic  No sugary beverages  At least 64oz of water daily  Try to increase exercise by 10 minutes/day  Practice lesson plans 1-6 in bariatric manual   Practice 30/60 rule  Increase physical activity by 10 minutes daily  ~1800 calories/day- see sample menu    B: protein shake + fruit  L: 3-4 oz lean protein + 1 cup nonstarchy vegetables  D: 5 oz lean protein + 1 c nonstarchy vegetables  + <1/2 c starch  S: lean protein: string cheese, greek/soy yogurt, 1/4 c nuts + fruits/vegetables     Diagnoses and all orders for this visit:    Morbid obesity with body mass index (BMI) of 40 0 to 49 9 ([facility])  -     CBC and Platelet; Future  -     TSH, 3rd generation with T4 reflex;  Future  -     Comprehensive metabolic panel; Pt presents to the ED with c/o anxiety and panic attack. Pt states his anxiety has been very high lately. Tonight he had a panic attack and feels \"like garbage\". Pt is not prescribed any anxiety meds at this time.   Future    SALAS (obstructive sleep apnea)    Benign essential HTN  -     CBC and Platelet; Future  -     TSH, 3rd generation with T4 reflex; Future  -     Comprehensive metabolic panel; Future    Gastroesophageal reflux disease, esophagitis presence not specified    IFG (impaired fasting glucose)  Comments:  -A1c 6 6 %, may improve with weight loss and lifestyle changes and following bariatric surgery  Orders:  -     CBC and Platelet; Future  -     TSH, 3rd generation with T4 reflex; Future  -     Comprehensive metabolic panel; Future    Abnormal weight gain  -     CBC and Platelet; Future  -     TSH, 3rd generation with T4 reflex; Future  -     Comprehensive metabolic panel; Future    Preoperative clearance  -     CBC and Platelet; Future  -     TSH, 3rd generation with T4 reflex; Future  -     Comprehensive metabolic panel; Future    Subjective:   Chief Complaint   Patient presents with    Consult     Pt here for Psychiatric Med 1/6  Patient ID: Camden Bates  is a 59 y o  male with excess weight here to pursue weight managment  Past Medical History:   Diagnosis Date    Benign hypertension     Calculus of gallbladder without cholecystitis without obstruction     H/O nonmelanoma skin cancer     Lumbar disc disease     Mitral valve disorder     Obesity     Sleep apnea     Sleep apnea      HPI:  Obesity/Excess Weight:  Severity: Severe  Onset:  Entire life    Modifiers: Diet and Exercise  Contributing factors: portion sizes, overeating  Associated symptoms: increased joint pain and inability to do certain activities, exercise is challenging  B: skips usually, but has been trying to add protein shake  L: leftovers  D: protein + vegetable + starch  S: dessert, but recently cutting back       The following portions of the patient's history were reviewed and updated as appropriate: allergies, current medications, past family history, past medical history, past social history, past surgical history and problem list     Review of Systems   Constitutional: Negative for chills and fever  HENT: Negative for sore throat  Respiratory: Negative for cough and shortness of breath  Cardiovascular: Negative for chest pain and palpitations  Gastrointestinal: Negative for abdominal pain, constipation, diarrhea, nausea and vomiting  GERD- well controlled with pantoprazole, considering trial off of the medication   Genitourinary: Negative for dysuria  Musculoskeletal: Positive for arthralgias and back pain  Skin: Negative for rash  Neurological: Negative for headaches  Psychiatric/Behavioral:        Denies sx of depression     Objective:   Physical Exam   Nursing note and vitals reviewed  Constitutional   General appearance: Abnormal   well developed and morbidly obese  Eyes No conjunctival pallor  Ears, Nose, Mouth, and Throat Oral mucosa moist    Pulmonary   Respiratory effort: No increased work of breathing or signs of respiratory distress  Auscultation of lungs: Clear to auscultation, equal breath sounds bilaterally, no wheezes, no rales, no rhonci  Cardiovascular   Auscultation of heart: Normal rate and rhythm, normal S1 and S2, without murmurs  Examination of extremities for edema and/or varicosities: trace-1+ edema  Abdomen   Abdomen: Abnormal   The abdomen was obese  Bowel sounds were normal  The abdomen was soft and nontender     Musculoskeletal   Gait and station: Normal     Psychiatric   Orientation to person, place and time: Normal     Affect: appropriate

## 2023-10-30 NOTE — DISCHARGE INSTR - AVS FIRST PAGE
your Narcotic meds from [address] in [facility] [address]   Stay hydrated, drink cups of water every 15 mins  Mild nausea is ok as long as you can drink fluids  Take your omeprazole daily  Crush your pills and open capsules, mix with liquid to drink    Follow up with Dr Stanley Yeung  and your PCP within the next week None

## (undated) DEVICE — GLOVE SRG BIOGEL 7.5

## (undated) DEVICE — SCD SEQUENTIAL COMPRESSION COMFORT SLEEVE LARGE KNEE LENGTH: Brand: KENDALL SCD

## (undated) DEVICE — ELECTRODE LAP SPATULA CRV E-Z CLEAN 33CM -0018C

## (undated) DEVICE — IRRIG ENDO FLO TUBING

## (undated) DEVICE — MEGA SUTURECUT ND: Brand: ENDOWRIST

## (undated) DEVICE — HARMONIC ACE +7 LAPAROSCOPIC SHEARS ADVANCED HEMOSTASIS 5MM DIAMETER 36CM SHAFT LENGTH  FOR USE WITH GRAY HAND PIECE ONLY: Brand: HARMONIC ACE

## (undated) DEVICE — STANDARD SURGICAL GOWN, L: Brand: CONVERTORS

## (undated) DEVICE — FIAPC® PROBE W/ FILTER 2200 C OD 2.3MM/6.9FR; L 2.2M/7.2FT: Brand: ERBE

## (undated) DEVICE — SUT ETHILON 3-0 PS-1 18 IN 1663G

## (undated) DEVICE — Device: Brand: DEFENDO AIR/WATER/SUCTION AND BIOPSY VALVE

## (undated) DEVICE — DRAPE TOWEL: Brand: CONVERTORS

## (undated) DEVICE — BLUE HEAT SCOPE WARMER

## (undated) DEVICE — TUBING SUCTION 5MM X 12 FT

## (undated) DEVICE — ENDOPOUCH RETRIEVER SPECIMEN RETRIEVAL BAGS: Brand: ENDOPOUCH RETRIEVER

## (undated) DEVICE — PMI DISPOSABLE PUNCTURE CLOSURE DEVICE / SUTURE GRASPER: Brand: PMI

## (undated) DEVICE — SURGICAL GOWN, XL SMARTSLEEVE: Brand: CONVERTORS

## (undated) DEVICE — BLADELESS OBTURATOR: Brand: WECK VISTA

## (undated) DEVICE — ABSORBABLE WOUND CLOSURE DEVICE: Brand: V-LOC 90

## (undated) DEVICE — ENDO STITCH DEVICE 10 MM

## (undated) DEVICE — COVIDIEN ENDO GIA PURPLE (MED) RELOAD 60MM

## (undated) DEVICE — TRAVELKIT CONTAINS FIRST STEP KIT (200ML EP-4 KIT) AND SOILED SCOPE BAG - 1 KIT: Brand: TRAVELKIT CONTAINS FIRST STEP KIT AND SOILED SCOPE BAG

## (undated) DEVICE — VIOLET BRAIDED (POLYGLACTIN 910), SYNTHETIC ABSORBABLE SUTURE: Brand: COATED VICRYL

## (undated) DEVICE — STAPLER 60: Brand: SUREFORM

## (undated) DEVICE — URETERAL CATHETER ADAPTOR TIP

## (undated) DEVICE — ALLENTOWN LAP CHOLE APP PACK: Brand: CARDINAL HEALTH

## (undated) DEVICE — WEBRIL 6 IN UNSTERILE

## (undated) DEVICE — INTENDED FOR TISSUE SEPARATION, AND OTHER PROCEDURES THAT REQUIRE A SHARP SURGICAL BLADE TO PUNCTURE OR CUT.: Brand: BARD-PARKER SAFETY BLADES SIZE 15, STERILE

## (undated) DEVICE — INTRODUCER ANAL ABDOM EEA25 DISP STRL

## (undated) DEVICE — TIP COVER ACCESSORY

## (undated) DEVICE — CHLORAPREP HI-LITE 26ML ORANGE

## (undated) DEVICE — NEEDLE 25G X 1 1/2

## (undated) DEVICE — MONOPOLAR CURVED SCISSORS: Brand: ENDOWRIST

## (undated) DEVICE — POWER SHELL SIGNIA

## (undated) DEVICE — SUT ETHILON 2-0 FS 18 IN 664H

## (undated) DEVICE — ENDOPATH 5MM CURVED SCISSORS WITH MONOPOLAR CAUTERY: Brand: ENDOPATH

## (undated) DEVICE — VISIGI 3D®  CALIBRATION SYSTEM  SIZE 36FR SLEEVE/STD: Brand: BOEHRINGER® VISIGI 3D™ SLEEVE GASTRECTOMY CALIBRATION SYSTEM, SIZE 36FR

## (undated) DEVICE — JACKSON-PRATT 100CC BULB RESERVOIR: Brand: CARDINAL HEALTH

## (undated) DEVICE — ENDOPATH XCEL BLADELESS TROCARS WITH STABILITY SLEEVES: Brand: ENDOPATH XCEL

## (undated) DEVICE — 3000CC GUARDIAN II: Brand: GUARDIAN

## (undated) DEVICE — SCD SEQUENTIAL COMPRESSION COMFORT SLEEVE MEDIUM KNEE LENGTH: Brand: KENDALL SCD

## (undated) DEVICE — NEEDLE HYPO 22G X 1-1/2 IN

## (undated) DEVICE — ARM DRAPE

## (undated) DEVICE — ANTI-FOG SOLUTION WITH FOAM PAD: Brand: DEVON

## (undated) DEVICE — GLOVE SRG BIOGEL 8

## (undated) DEVICE — SYRINGE 30ML LL

## (undated) DEVICE — GLOVE INDICATOR PI UNDERGLOVE SZ 7 BLUE

## (undated) DEVICE — JP CHANNEL DRAIN 19FR, FULL FLUTES: Brand: JACKSON-PRATT

## (undated) DEVICE — TROCAR VISIPORT

## (undated) DEVICE — TROCAR: Brand: KII FIOS FIRST ENTRY

## (undated) DEVICE — WORKING LENGTH 235CM, WORKING CHANNEL 2.8MM: Brand: RESOLUTION 360 CLIP

## (undated) DEVICE — 2000CC GUARDIAN II: Brand: GUARDIAN

## (undated) DEVICE — CADIERE FORCEPS: Brand: ENDOWRIST

## (undated) DEVICE — SUT MONOCRYL 4-0 PS-2 27 IN Y426H

## (undated) DEVICE — ENDOPATH XCEL UNIVERSAL TROCAR STABLILITY SLEEVES: Brand: ENDOPATH XCEL

## (undated) DEVICE — COTTON TIP APPLICTOR 2 PK

## (undated) DEVICE — TIBURON LAPAROSCOPIC ABDOMINAL DRAPE: Brand: CONVERTORS

## (undated) DEVICE — TUBING SMOKE EVAC W/FILTRATION DEVICE PLUMEPORT ACTIV

## (undated) DEVICE — 10 MM BABCOCKS WITH RATCHET HANDLES: Brand: ENDOPATH

## (undated) DEVICE — [HIGH FLOW INSUFFLATOR,  DO NOT USE IF PACKAGE IS DAMAGED,  KEEP DRY,  KEEP AWAY FROM SUNLIGHT,  PROTECT FROM HEAT AND RADIOACTIVE SOURCES.]: Brand: PNEUMOSURE

## (undated) DEVICE — Device: Brand: TISSUE RETRIEVAL SYSTEM

## (undated) DEVICE — COVIDIEN ENDO GIA PURPLE (MED) RELOAD 45MM

## (undated) DEVICE — SEAL

## (undated) DEVICE — VESSEL SEALER EXTEND: Brand: ENDOWRIST

## (undated) DEVICE — STAPLER 60 RELOAD WHITE: Brand: SUREFORM

## (undated) DEVICE — NEEDLE SCLERO INTERJECT 25G 240MM

## (undated) DEVICE — ADHESIVE SKIN CLSR DERMABOND NX

## (undated) DEVICE — ENDO STITCH 2-0 SURGIDAC 48 IN

## (undated) DEVICE — PENCIL ELECTROSURG E-Z CLEAN -0035H

## (undated) DEVICE — STAPLER 60 RELOAD BLACK: Brand: SUREFORM

## (undated) DEVICE — GLOVE SRG BIOGEL 7

## (undated) DEVICE — REDUCER: Brand: ENDOWRIST

## (undated) DEVICE — STAPLER ENDO GIA ROTICULATOR 60-2.5

## (undated) DEVICE — CANNULA SEAL

## (undated) DEVICE — ENDO STITCH 2-0 VICRYL

## (undated) DEVICE — Device

## (undated) DEVICE — COLUMN DRAPE